# Patient Record
Sex: MALE | Race: WHITE | Employment: FULL TIME | URBAN - METROPOLITAN AREA
[De-identification: names, ages, dates, MRNs, and addresses within clinical notes are randomized per-mention and may not be internally consistent; named-entity substitution may affect disease eponyms.]

---

## 2018-10-31 ENCOUNTER — TRANSFERRED RECORDS (OUTPATIENT)
Dept: HEALTH INFORMATION MANAGEMENT | Facility: CLINIC | Age: 28
End: 2018-10-31

## 2018-11-13 ENCOUNTER — TRANSFERRED RECORDS (OUTPATIENT)
Dept: HEALTH INFORMATION MANAGEMENT | Facility: CLINIC | Age: 28
End: 2018-11-13

## 2018-11-15 ENCOUNTER — TRANSFERRED RECORDS (OUTPATIENT)
Dept: HEALTH INFORMATION MANAGEMENT | Facility: CLINIC | Age: 28
End: 2018-11-15

## 2018-11-16 ENCOUNTER — HOSPITAL ENCOUNTER (INPATIENT)
Facility: HOSPITAL | Age: 28
LOS: 19 days | Discharge: PSYCHIATRIC OR SUBSTANCE USE FACILITY | End: 2018-12-05
Attending: PSYCHIATRY & NEUROLOGY | Admitting: PSYCHIATRY & NEUROLOGY
Payer: COMMERCIAL

## 2018-11-16 DIAGNOSIS — F33.1 MAJOR DEPRESSIVE DISORDER, RECURRENT EPISODE, MODERATE (H): ICD-10-CM

## 2018-11-16 DIAGNOSIS — F17.213 CIGARETTE NICOTINE DEPENDENCE WITH WITHDRAWAL: ICD-10-CM

## 2018-11-16 DIAGNOSIS — F41.9 ANXIETY: Primary | ICD-10-CM

## 2018-11-16 PROBLEM — F29 PSYCHOSIS (H): Status: ACTIVE | Noted: 2018-11-16

## 2018-11-16 PROCEDURE — 12400011

## 2018-11-16 PROCEDURE — 12400000 ZZH R&B MH

## 2018-11-16 RX ORDER — HYDROXYZINE HYDROCHLORIDE 25 MG/1
25-50 TABLET, FILM COATED ORAL EVERY 4 HOURS PRN
Status: DISCONTINUED | OUTPATIENT
Start: 2018-11-16 | End: 2018-11-17

## 2018-11-16 RX ORDER — OLANZAPINE 10 MG/2ML
10 INJECTION, POWDER, FOR SOLUTION INTRAMUSCULAR
Status: DISCONTINUED | OUTPATIENT
Start: 2018-11-16 | End: 2018-12-05 | Stop reason: HOSPADM

## 2018-11-16 RX ORDER — ACETAMINOPHEN 325 MG/1
650 TABLET ORAL EVERY 4 HOURS PRN
Status: DISCONTINUED | OUTPATIENT
Start: 2018-11-16 | End: 2018-12-05 | Stop reason: HOSPADM

## 2018-11-16 RX ORDER — BISACODYL 10 MG
10 SUPPOSITORY, RECTAL RECTAL DAILY PRN
Status: DISCONTINUED | OUTPATIENT
Start: 2018-11-16 | End: 2018-12-05 | Stop reason: HOSPADM

## 2018-11-16 RX ORDER — TRAZODONE HYDROCHLORIDE 50 MG/1
50 TABLET, FILM COATED ORAL
Status: DISCONTINUED | OUTPATIENT
Start: 2018-11-16 | End: 2018-11-27

## 2018-11-16 RX ORDER — OLANZAPINE 10 MG/1
10 TABLET ORAL
Status: DISCONTINUED | OUTPATIENT
Start: 2018-11-16 | End: 2018-12-05 | Stop reason: HOSPADM

## 2018-11-16 RX ORDER — ALUMINA, MAGNESIA, AND SIMETHICONE 2400; 2400; 240 MG/30ML; MG/30ML; MG/30ML
30 SUSPENSION ORAL EVERY 4 HOURS PRN
Status: DISCONTINUED | OUTPATIENT
Start: 2018-11-16 | End: 2018-12-05 | Stop reason: HOSPADM

## 2018-11-16 ASSESSMENT — ACTIVITIES OF DAILY LIVING (ADL)
BATHING: 0-->INDEPENDENT
RETIRED_EATING: 0-->INDEPENDENT
RETIRED_COMMUNICATION: 0-->UNDERSTANDS/COMMUNICATES WITHOUT DIFFICULTY
AMBULATION: 0-->INDEPENDENT
DRESS: 0-->INDEPENDENT
TRANSFERRING: 0-->INDEPENDENT
COGNITION: 0 - NO COGNITION ISSUES REPORTED
SWALLOWING: 0-->SWALLOWS FOODS/LIQUIDS WITHOUT DIFFICULTY
FALL_HISTORY_WITHIN_LAST_SIX_MONTHS: NO
TOILETING: 0-->INDEPENDENT

## 2018-11-16 NOTE — IP AVS SNAPSHOT
STOP!!! DO NOT PRINT OR REFERENCE THIS AVS!!!  AVS displayed here is NOT the version that was given to the patient at discharge.  GO TO CHART REVIEW to print or review a copy of the AVS that was frozen/printed at time of discharge.                           MRN:[id number]                      After Visit Summary   11/16/2018    Carlo Allen    MRN: [id number]           Thank you!     Thank you for choosing [facility] for your care. Our goal is always to provide you with excellent care. Hearing back from our patients is one way we can continue to improve our services. Please take a few minutes to complete the written survey that you may receive in the mail after you visit with us. Thank you!        Patient Information     Date Of Birth          1990        Designated Caregiver       Most Recent Value    Caregiver    Will someone help with your care after discharge? no      About your hospital stay     You were admitted on:  November 16, 2018 You last received care in the: [facility]    You were discharged on:  December 5, 2018       Who to Call     For medical emergencies, please call 911.  For non-urgent questions about your medical care, please call your primary care provider or clinic, [phone]          Attending Provider     Provider Specialty    Segundo Michelle MD Psychiatry    Mahsa Iraheta NP Licensed Mental Health       Primary Care Provider Office Phone # Fax #    Maria K [place], [phone]      Further instructions from your care team       Behavioral Discharge Planning and Instructions    Summary: Carlo was admitted to  due to a revocation of his stay of commitment     Main Diagnosis: Major Depressive Disorder, Recurrent, Severe without psychosis, PTSD, Alcohol use disorder, moderate, dependence w/o withdrawal, Traumatic Brain Injury, unspecified    Major Treatments, Procedures and Findings: Stabilize with medications, connect with community  programs.    Symptoms to Report: feeling more aggressive, increased confusion, losing more sleep, mood getting worse or thoughts of suicide    Lifestyle Adjustment: Take all medications as prescribed, meet with doctor/ medication provider, out patient therapist, , and [facility] worker as scheduled. Abstain from alcohol or any unprescribed drugs.    Psychiatry Follow-up:     [facility]   - Pamela Rae - [phone]  CD  - Maggie Aldana - [phone]   Pool BROWNING (supervisor) - [phone]   Fax: [phone]    [facility] treatment  Contact - Bridget   [address] - [address]  [place]  [address]  Phone - [phone]  Fax - [phone]     Resources:   Crisis Intervention: 539.135.1728 or 116-600-9247 (TTY: 530.665.6029).  Call anytime for help.  National [place] on Mental Illness (www.mn.sayda.org): 975.935.4100 or 104-438-2024.  Alcoholics Anonymous (www.alcoholics-anonymous.org): Check your phone book for your local chapter.  Suicide Awareness Voices of Education (SAVE) (www.save.org): 516-575-CPTN (9347)  National Suicide Prevention Line (www.mentalhealthmn.org): 769-277-LZXY (6974)  Mental Health Consumer/Survivor Network of [place] (www.mhcsn.net): 806.573.3598 or 524-636-3150  Mental Health Association of [place] (www.mentalhealth.org): 195.608.7512 or 184-817-4468    General Medication Instructions:   See your medication sheet(s) for instructions.   Take all medicines as directed.  Make no changes unless your doctor suggests them.   Go to all your doctor visits.  Be sure to have all your required lab tests. This way, your medicines can be refilled on time.  Do not use any drugs not prescribed by your doctor.  Avoid alcohol.    [place] Area:  [facility], [facility] stabilization [facility]- [phone]  [facility] Crisis Line: 1-480.137.4326  [facility]ates For Family Peace: 811-2320  Sexual Assault Program [facility]: 689-541-2321 or 1-256.680.4028  [facility]  "[place] Battered Women's Program: 0-087-531-3254 Ext: 279       Calls answered Mon-Fri-8:00 am--4:30 pm    [place]:  [facility] Family Peace: 1-704.301.2864  [facility] first call for help: 0-574-757-2114  [facility] Crisis [facility]:  (674) [phone]      [place] Area:  Warm Line: 1-596.659.7676       Calls answered Tuesday--Saturday 4:00 pm--10:00 pm  [facility] Crisis Line - [phone]  [facility] 510-541-4062    [place] Statewide:  [place] Crisis and Referral Services: 1-124.919.5232  National Suicide Prevention Lifeline: 9-836-282-TALK (2828)   - dkv8hamn- Text \"Life\" to 25983  First Call for Help: 2-1-1  [facility] Helpline- 8-143-HPOV-HELP        Pending Results     No orders found from 11/14/2018 to 11/17/2018.            Statement of Approval     Ordered          12/04/18 1245  I have reviewed and agree with all the recommendations and orders detailed in this document.  EFFECTIVE NOW     Approved and electronically signed by:  Carlos Page NP             Admission Information     Date & Time Department Dept. Phone    11/16/2018 [facility] [phone]      Your Vitals Were     Blood Pressure Pulse Temperature Respirations Height Weight    106/63 85 97.2  F (36.2  C) (Tympanic) 14 1.727 m (5' 8\") 72.7 kg (160 lb 3.2 oz)    Pulse Oximetry BMI (Body Mass Index)                96% 24.36 kg/m2          Affectv Information     Affectv lets you send messages to your doctor, view your test results, renew your prescriptions, schedule appointments and more. To sign up, go to [email or web address] . Click on \"Log in\" on the left side of the screen, which will take you to the Welcome page. Then click on \"Sign up Now\" on the right side of the page.     You will be asked to enter the access code listed below, as well as some personal information. Please follow the directions to create your username and password.     Your access code is: [email or web address]  Expires: 3/4/2019 11:42 PM   "   Your access code will  in 90 days. If you need help or a new code, please call your [facility] clinic or [phone].        Care EveryWhere ID     This is your Care EveryWhere ID. This could be used by other organizations to access your [facility] medical records  [id number]        Equal Access to Services     JOSE RAFAEL VALDEZHARRY : Hadii cheo ku hadasho Soomaali, waaxda luqadaha, qaybta kaalmada adesatishyada, joanne bowdendeiondenver perdomo. So [facility] [phone].    ATENCIÓN: Si habla español, tiene a ghosh disposición servicios gratuitos de asistencia lingüística. Llame al [phone].    We comply with applicable federal civil rights laws and [place] laws. We do not discriminate on the basis of race, color, national origin, age, disability, sex, sexual orientation, or gender identity.               Review of your medicines      START taking        Dose / Directions    busPIRone 15 MG tablet   Commonly known as:  BUSPAR   Used for:  Anxiety        Dose:  15 mg   Take 1 tablet (15 mg) by mouth 2 times daily   Quantity:  60 tablet   Refills:  1       hydrOXYzine 50 MG tablet   Commonly known as:  ATARAX   Used for:  Anxiety        Dose:   mg   Take 1-2 tablets ( mg) by mouth every 4 hours as needed for anxiety   Quantity:  60 tablet   Refills:  1       nicotine 21 MG/24HR 24 hr patch   Commonly known as:  NICODERM CQ   Used for:  Cigarette nicotine dependence with withdrawal        Dose:  1 patch   Place 1 patch onto the skin daily   Quantity:  30 patch   Refills:  1       prazosin 1 MG capsule   Commonly known as:  MINIPRESS   Used for:  Anxiety        Dose:  1 mg   Take 1 capsule (1 mg) by mouth At Bedtime   Quantity:  30 capsule   Refills:  1       QUEtiapine 25 MG tablet   Commonly known as:  SEROquel   Used for:  Anxiety        Dose:  25-50 mg   Take 1-2 tablets (25-50 mg) by mouth 3 times daily as needed (anxiety)   Quantity:  60 tablet   Refills:  1       traZODone 100 MG tablet   Commonly  known as:  DESYREL   Used for:  Major depressive disorder, recurrent episode, moderate (H)        Dose:  200 mg   Take 2 tablets (200 mg) by mouth nightly as needed for sleep   Quantity:  60 tablet   Refills:  1         STOP taking     divalproex sodium delayed-release 500 MG DR tablet   Commonly known as:  DEPAKOTE           OLANZAPINE PO                Where to get your medicines      These medications were sent to [facility] - [address]  [address] [place] [address]     Phone:  [phone]     busPIRone 15 MG tablet    hydrOXYzine 50 MG tablet    nicotine 21 MG/24HR 24 hr patch    prazosin 1 MG capsule    QUEtiapine 25 MG tablet    traZODone 100 MG tablet                Protect others around you: Learn how to safely use, store and throw away your medicines at www.disposemymeds.org.             Medication List: This is a list of all your medications and when to take them. Check marks below indicate your daily home schedule. Keep this list as a reference.      Medications           Morning Afternoon Evening Bedtime As Needed    busPIRone 15 MG tablet   Commonly known as:  BUSPAR   Take 1 tablet (15 mg) by mouth 2 times daily   Last time this was given:  15 mg on 12/5/2018  6:47 AM                                hydrOXYzine 50 MG tablet   Commonly known as:  ATARAX   Take 1-2 tablets ( mg) by mouth every 4 hours as needed for anxiety   Last time this was given:  100 mg on 11/26/2018 12:58 PM                                nicotine 21 MG/24HR 24 hr patch   Commonly known as:  NICODERM CQ   Place 1 patch onto the skin daily   Last time this was given:  1 patch on 12/4/2018  8:43 AM                                prazosin 1 MG capsule   Commonly known as:  MINIPRESS   Take 1 capsule (1 mg) by mouth At Bedtime   Last time this was given:  1 mg on 12/4/2018  8:03 PM                                QUEtiapine 25 MG tablet   Commonly known as:  SEROquel   Take 1-2 tablets  (25-50 mg) by mouth 3 times daily as needed (anxiety)   Last time this was given:  50 mg on 12/4/2018  8:05 PM                                traZODone 100 MG tablet   Commonly known as:  DESYREL   Take 2 tablets (200 mg) by mouth nightly as needed for sleep   Last time this was given:  200 mg on 12/4/2018  8:05 PM                                          More Information        Naltrexone Hydrochloride, Bupropion Hydrochloride Oral tablet, extended-release  What is this medicine?  BUPROPION; NALTREXONE (byoo PROE pee on; nal TREX one) is a combination product used to promote and maintain weight loss in obese adults or overweight adults who also have weight related medical problems. This medicine should be used with a reduced calorie diet and increased physical activity.  This medicine may be used for other purposes; ask your health care provider or pharmacist if you have questions.  What should I tell my health care provider before I take this medicine?  They need to know if you have any of these conditions:    an eating disorder, such as anorexia or bulimia    diabetes    glaucoma    head injury    heart disease    high blood pressure    history of a drug or alcohol abuse problem    history of a tumor or infection of your brain or spine    history of stroke    history of irregular heartbeat    kidney disease    liver disease    mental illness such asbipolar disorder or psychosis    seizures    suicidal thoughts, plans, or attempt; a previous suicide attempt by you or a family member    an unusual or allergic reaction to bupropion, naltrexone, other medicines, foods, dyes, or preservativesbreast-feeding    pregnant or trying to become pregnant  How should I use this medicine?  Take this medicine by mouth with a glass of water. Follow the directions on the prescription label. Take this medicine in the morning and in the evenings as directed by your healthcare professional. You can take it with or without food. Do  not take with high-fat meals as this may increase your risk of seizures. Do not crush, chew, or cut these tablets. Do not take your medicine more often than directed. Do not stop taking this medicine suddenly except upon the advice of your doctor.  A special MedGuide will be given to you by the pharmacist with each prescription and refill. Be sure to read this information carefully each time.  Talk to your pediatrician regarding the use of this medicine in children. Special care may be needed.  Overdosage: If you think you've taken too much of this medicine contact a poison control center or emergency room at once.  NOTE: This medicine is only for you. Do not share this medicine with others.  What if I miss a dose?  If you miss a dose, skip the missed dose and take your next tablet at the regular time. Do not take double or extra doses.  What may interact with this medicine?  Do not take this medicine with any of the following medications:    any prescription or street opioid drug like codiene, heroin, methadone    linezolid    MAOIs like Carbex, Eldepryl, Marplan, Nardil, and Parnate    methylene blue (injected into a vein)    other medicines that contain bupropion like Zyban or Wellbutrin  This medicine may also interact with the following medications:    alcohol    certain medicines for anxiety or sleep    certain medicines for blood pressure like metoprolol, propranolol    certain medicines for depression or psychotic disturbances    certain medicines for HIV or AIDS like efavirenz, lopinavir, nelfinavir, ritonavir    certain medicines for irregular heart beat like propafenone, flecainide    certain medicines for Parkinson's disease like amantadine, levodopa    certain medicines for seizures like carbamazepine, phenytoin, phenobarbital    cimetidine    clopidogrel    cyclophosphamide    disulfiram    furazolidone    isoniazid    nicotine    orphenadrine    procarbazine    steroid medicines like prednisone or  cortisone    stimulant medicines for attention disorders, weight loss, or to stay awake    tamoxifen    theophylline    thioridazine    thiotepa    ticlopidine    tramadol    warfarin  This list may not describe all possible interactions. Give your health care provider a list of all the medicines, herbs, non-prescription drugs, or dietary supplements you use. Also tell them if you smoke, drink alcohol, or use illegal drugs. Some items may interact with your medicine.  What should I watch for while using this medicine?  This medicine is intended to be used in addition to a healthy diet and appropriate exercise. The best results are achieved this way. Do not increase or in any way change your dose without consulting your doctor or health care professional. Do not take this medicine with other prescription or over-the-counter weight loss products without consulting your doctor or health care professional. Your doctor should tell you to stop taking this medicine if you do not lose a certain amount of weight within the first 12 weeks of treatment.  Visit your doctor or health care professional for regular checkups. Your doctor may order blood tests or other tests to see how you are doing.  This medicine may affect blood sugar levels. If you have diabetes, check with your doctor or health care professional before you change your diet or the dose of your diabetic medicine.  Patients and their families should watch out for new or worsening depression or thoughts of suicide. Also watch out for sudden changes in feelings such as feeling anxious, agitated, panicky, irritable, hostile, aggressive, impulsive, severely restless, overly excited and hyperactive, or not being able to sleep. If this happens, especially at the beginning of treatment or after a change in dose, call your health care professional.  Avoid alcoholic drinks while taking this medicine. Drinking large amounts of alcoholic beverages, using sleeping or anxiety  medicines, or quickly stopping the use of these agents while taking this medicine may increase your risk for a seizure.  What side effects may I notice from receiving this medicine?  Side effects that you should report to your doctor or health care professional as soon as possible:    allergic reactions like skin rash, itching or hives, swelling of the face, lips, or tongue    breathing problems    changes in vision, hearing    chest pain    confusion    dark urine    depressed mood    fast or irregular heart beat    fever    hallucination, loss of contact with reality    increased blood pressure    light-colored stools    redness, blistering, peeling or loosening of the skin, including inside the mouth    right upper belly pain    seizures    suicidal thoughts or other mood changes    unusually weak or tired    vomiting    yellowing of the eyes or skin  Side effects that usually do not require medical attention (Report these to your doctor or health care professional if they continue or are bothersome.):    constipation    diarrhea    dizziness    dry mouth    headache    nausea    trouble sleeping  This list may not describe all possible side effects. Call your doctor for medical advice about side effects. You may report side effects to FDA at 7-010-FDA-1415.  Where should I keep my medicine?  Keep out of the reach of children.  Store at room temperature between 15 and 30 degrees C (59 and 86 degrees F). Throw away any unused medicine after the expiration date.  NOTE: This sheet is a summary. It may not cover all possible information. If you have questions about this medicine, talk to your doctor, pharmacist, or health care provider.  NOTE:This sheet is a summary. It may not cover all possible information. If you have questions about this medicine, talk to your doctor, pharmacist, or health care provider. Copyright  2016 Gold Standard

## 2018-11-16 NOTE — IP AVS SNAPSHOT
[facility]    [address] [place] [address]    Phone:  [phone]    Fax:  [phone]                                       After Visit Summary   11/16/2018    Carlo Allen    MRN: [id number]           After Visit Summary Signature Page     I have received my discharge instructions, and my questions have been answered. I have discussed any challenges I see with this plan with the nurse or doctor.    ..........................................................................................................................................  Patient/Patient Representative Signature      ..........................................................................................................................................  Patient Representative Print Name and Relationship to Patient    ..................................................               ................................................  Date                                   Time    ..........................................................................................................................................  Reviewed by Signature/Title    ...................................................              ..............................................  Date                                               Time          22EPIC Rev 08/18

## 2018-11-17 PROCEDURE — 25000132 ZZH RX MED GY IP 250 OP 250 PS 637: Performed by: NURSE PRACTITIONER

## 2018-11-17 PROCEDURE — 12400011

## 2018-11-17 PROCEDURE — 99223 1ST HOSP IP/OBS HIGH 75: CPT | Performed by: NURSE PRACTITIONER

## 2018-11-17 PROCEDURE — 12400000 ZZH R&B MH

## 2018-11-17 RX ORDER — DIVALPROEX SODIUM 500 MG/1
500 TABLET, DELAYED RELEASE ORAL DAILY
Status: ON HOLD | COMMUNITY
End: 2018-12-04

## 2018-11-17 RX ORDER — HYDROXYZINE HYDROCHLORIDE 25 MG/1
50-100 TABLET, FILM COATED ORAL EVERY 4 HOURS PRN
Status: DISCONTINUED | OUTPATIENT
Start: 2018-11-17 | End: 2018-12-05 | Stop reason: HOSPADM

## 2018-11-17 RX ORDER — DIVALPROEX SODIUM 500 MG/1
1000 TABLET, DELAYED RELEASE ORAL AT BEDTIME
Status: ON HOLD | COMMUNITY
End: 2018-12-04

## 2018-11-17 RX ORDER — NICOTINE 21 MG/24HR
1 PATCH, TRANSDERMAL 24 HOURS TRANSDERMAL DAILY
Status: DISCONTINUED | OUTPATIENT
Start: 2018-11-17 | End: 2018-12-05 | Stop reason: HOSPADM

## 2018-11-17 RX ORDER — LAMOTRIGINE 25 MG/1
25 TABLET ORAL DAILY
Status: DISCONTINUED | OUTPATIENT
Start: 2018-11-17 | End: 2018-11-24

## 2018-11-17 RX ORDER — PRAZOSIN HYDROCHLORIDE 1 MG/1
1 CAPSULE ORAL AT BEDTIME
Status: DISCONTINUED | OUTPATIENT
Start: 2018-11-17 | End: 2018-12-05 | Stop reason: HOSPADM

## 2018-11-17 RX ADMIN — NICOTINE 1 PATCH: 21 PATCH, EXTENDED RELEASE TRANSDERMAL at 09:20

## 2018-11-17 RX ADMIN — PHENYTOIN 1 MG: 125 SUSPENSION ORAL at 20:05

## 2018-11-17 RX ADMIN — LAMOTRIGINE 25 MG: 25 TABLET ORAL at 09:20

## 2018-11-17 RX ADMIN — HYDROXYZINE HYDROCHLORIDE 50 MG: 25 TABLET, FILM COATED ORAL at 09:19

## 2018-11-17 RX ADMIN — HYDROXYZINE HYDROCHLORIDE 100 MG: 25 TABLET, FILM COATED ORAL at 20:36

## 2018-11-17 RX ADMIN — TRAZODONE HYDROCHLORIDE 50 MG: 50 TABLET ORAL at 00:20

## 2018-11-17 ASSESSMENT — ACTIVITIES OF DAILY LIVING (ADL)
GROOMING: INDEPENDENT
LAUNDRY: UNABLE TO COMPLETE
DRESS: SCRUBS (BEHAVIORAL HEALTH);INDEPENDENT
ORAL_HYGIENE: INDEPENDENT

## 2018-11-17 NOTE — PLAN OF CARE
Problem: Patient Care Overview  Goal: Individualization & Mutuality  Patient will be cooperative with assessments  Patient will eat at least 75% meals  Patient will sleep 6-8 hours     0020 patient had a snack after having a short call to [facility] , then showered  And requested and given a prn trazodone 50 mg by nurse and then went to bed. 0532 patient continues to sleep at this time.
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
## 2018-11-17 NOTE — PROGRESS NOTES
11/17/18 0022   Patient Belongings   Did you bring any home meds/supplements to the hospital?  No   Patient Belongings cell phone/electronics;clothing;money (see comment);wallet  ($68.00)   Disposition of Belongings Sent to security per site process;Kept with patient   Belongings Search Yes   Clothing Search Yes   Second Staff Ashleigh   General Info Comment Pillow with blue case Brown boots Black sweats [place] pack 2 cigs phone  6 pairs black socks grey under wear blue underwear 2 gray long sleeve 2 black dick maroon dick blue dick 2 jeans folder misc papers white slipon shoes belt grey hoodie camo hat skoel tin .21 cents tooth brush and paste lotion hair brush body wash deoderant blistex    List items sent to safe: Samsung phone no cracks black case brown wallet express card visa card D.L. $68.00 misc cards  All other belongings put in assigned cubby in belongings room.       I have reviewed my belongings list on admission and verify that it is correct.     Patient signature_______________________________    Second staff witness (if patient unable to sign) ______________________________       I have received all my belongings at discharge.    Patient signature________________________________    Terry   11/17/2018  12:30 AM    11/25/18 Items added to belongings: Black jacket

## 2018-11-17 NOTE — PLAN OF CARE
Face to face end of shift report received from Amanda CASTRO RN. Rounding completed. Patient observed in [facility].     Mellissa Mills  11/17/2018  3:33 PM

## 2018-11-17 NOTE — PROGRESS NOTES
Face to face end of shift report received from norma hoyt rn at 2300 report. . Rounding completed. Patient observed.     Alida Caceres  11/17/2018  12:42 AM
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
## 2018-11-17 NOTE — PLAN OF CARE
"Problem: Patient Care Overview  Goal: Individualization & Mutuality  Patient will be cooperative with assessments  Patient will eat at least 75% meals  Patient will sleep 6-8 hours     Outcome: No Change  Patient endorsed anxiety rated 8 of 10 and depression at 4 of 10. He denied HI/SI and hallucinations. Patient denied pain. He talked about feeling like he is here against his will and he doesn't feel it is fair that he asked for help and then was forced to go to the hospital. Patient stated he doesn't usually have trouble with sleep when he's at home. He declined a flu shot and declined to see a  while inpatient. Patient reported his only medical concerns are his chron's but he controls it with diet. Patient requested 50mg Atarax at after finding out he is not allowed to leave. He was very concerned about his fiance and reported she was in the ER in [place] \"having a miscarriage.\" Patient received education on his two new meds which are Lamictal and Minipress. He denied having any more questions about these meds. Patient slept soundly after taking the Atarax and was unable to finish all the admission questions. He showered.    Problem: Depressive Symptoms  Goal: Depressive Symptoms  Signs and symptoms of listed problems will be absent or manageable.  Patient's depression will be improved by discharge  Patient will remain calm and in control    Patient will verbalize if anxiety and or anger is presenting a problem.   Outcome: No Change  No progress made towards goals.       "

## 2018-11-17 NOTE — PLAN OF CARE
Face to face end of shift report received from Alida AGUILAR RN. Rounding completed. Patient observed in the lounge..     Amanda Britt  11/17/2018  12:58 PM
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
## 2018-11-18 PROCEDURE — 12400000 ZZH R&B MH

## 2018-11-18 PROCEDURE — 12400011

## 2018-11-18 PROCEDURE — 25000132 ZZH RX MED GY IP 250 OP 250 PS 637: Performed by: NURSE PRACTITIONER

## 2018-11-18 RX ADMIN — LAMOTRIGINE 25 MG: 25 TABLET ORAL at 08:37

## 2018-11-18 RX ADMIN — PHENYTOIN 1 MG: 125 SUSPENSION ORAL at 20:34

## 2018-11-18 RX ADMIN — NICOTINE 1 PATCH: 21 PATCH, EXTENDED RELEASE TRANSDERMAL at 08:37

## 2018-11-18 RX ADMIN — HYDROXYZINE HYDROCHLORIDE 100 MG: 25 TABLET, FILM COATED ORAL at 13:03

## 2018-11-18 ASSESSMENT — ACTIVITIES OF DAILY LIVING (ADL)
GROOMING: INDEPENDENT
GROOMING: INDEPENDENT
ORAL_HYGIENE: INDEPENDENT
LAUNDRY: UNABLE TO COMPLETE
DRESS: SCRUBS (BEHAVIORAL HEALTH);INDEPENDENT
ORAL_HYGIENE: INDEPENDENT
LAUNDRY: UNABLE TO COMPLETE
DRESS: INDEPENDENT;SCRUBS (BEHAVIORAL HEALTH)

## 2018-11-18 NOTE — PLAN OF CARE
"Problem: Patient Care Overview  Goal: Individualization & Mutuality  Patient will be cooperative with assessments  Patient will eat at least 75% meals  Patient will sleep 6-8 hours     Outcome: No Change  Patient is pleasant and cooperative with nursing assessment. Endorsing anxiety and depression due to hospitalization. States he is upset due to asking for help and being held in hospital under revocation of commitment. Tells this writer that his fiance is also going through a miscarriage at this time and says it is the second loss of child they have experienced. Becomes tearful during conversation. States he would like to get help due to his depression causing him to drink, then becoming aggressive, and then \"being full of regret after\". Denies SI stating he has never made a plan or attempt for suicide. States \"I only said that as a cry for help\". Denies HI, pain, and hallucinations. Agrees to contact staff if having thoughts of self harm. Patient social with peers and spends majority of shift in lounge. Agrees to make needs known.   Received PRN Atarax 100 mg at 2036 for anxiety.   Requesting nicotine lozenge instead of gum (sticky note done).     Problem: Depressive Symptoms  Goal: Depressive Symptoms  Signs and symptoms of listed problems will be absent or manageable.  Patient's depression will be improved by discharge  Patient will remain calm and in control    Patient will verbalize if anxiety and or anger is presenting a problem.   Outcome: No Change  Reports depression this shift due to commitment issues.   Remains in control of behaviors this shift.   Endorses anxiety but declines PRN. Does not exhibit any aggressive symptoms this shift.       "

## 2018-11-18 NOTE — PROGRESS NOTES
Face to face end of shift report received from dayne wheeler rn at 2300 report.. Rounding completed. Patient observed.     Alida Caceres  11/18/2018  12:30 AM

## 2018-11-18 NOTE — PLAN OF CARE
Face to face end of shift report received from STEPH Irwin. Rounding completed. Patient observed in day room watching tv.      Laura Richter  11/18/2018  7:52 AM

## 2018-11-18 NOTE — PLAN OF CARE
Face to face end of shift report received from STEPH Sanchez. Rounding completed. Patient observed laying in bed.      Laura Richter  11/18/2018  3:42 PM

## 2018-11-18 NOTE — PLAN OF CARE
Problem: Patient Care Overview  Goal: Individualization & Mutuality  Patient will be cooperative with assessments  Patient will eat at least 75% meals  Patient will sleep 6-8 hours     Pt was cooperative with nursing assessment and medications.  Pt reports 10/10 anxiety and said that he never had depression.  Pt ate breakfast and went back to bed.  Pt ate lunch and watched some tv.  Pt came up and asked for Atarax, which he received 100 mg at 1303.  He then went to lay down.  Pt reports that he still wishes he was in the ED.  That he is wants to go CD tx for his ETOH use and that he already had his Rule 25.      EVENING SHIFT:    Pt was in bed at start of shift.  He got up and watched tv.  He has a blunted/flat affect.  Pt said that he has been at a 10/10 anxiety all day and nothing has relieved it.      Goal: Team Discussion  Team Plan:   BEHAVIORAL TEAM DISCUSSION    Participants:   Progress:   Continued Stay Criteria/Rationale:   Medical/Physical:   Precautions:   Behavioral Orders   Procedures     Code 1 - Restrict to Unit     Routine Programming     As clinically indicated     Self Injury Precaution     Status 15     Every 15 minutes.     Plan:   Rationale for change in precautions or plan:       Problem: Depressive Symptoms  Goal: Depressive Symptoms  Signs and symptoms of listed problems will be absent or manageable.  Patient's depression will be improved by discharge  Patient will remain calm and in control    Patient will verbalize if anxiety and or anger is presenting a problem.   Pt reported high anxiety.  Will reevaluate his depression when pt wakes.

## 2018-11-19 PROCEDURE — 12400000 ZZH R&B MH

## 2018-11-19 PROCEDURE — 25000132 ZZH RX MED GY IP 250 OP 250 PS 637: Performed by: NURSE PRACTITIONER

## 2018-11-19 PROCEDURE — 97165 OT EVAL LOW COMPLEX 30 MIN: CPT | Mod: GO

## 2018-11-19 PROCEDURE — 99232 SBSQ HOSP IP/OBS MODERATE 35: CPT | Performed by: NURSE PRACTITIONER

## 2018-11-19 PROCEDURE — 12400011

## 2018-11-19 PROCEDURE — 40000133 ZZH STATISTIC OT WARD VISIT

## 2018-11-19 RX ADMIN — LAMOTRIGINE 25 MG: 25 TABLET ORAL at 08:35

## 2018-11-19 RX ADMIN — PHENYTOIN 1 MG: 125 SUSPENSION ORAL at 20:22

## 2018-11-19 RX ADMIN — HYDROXYZINE HYDROCHLORIDE 100 MG: 25 TABLET, FILM COATED ORAL at 20:22

## 2018-11-19 RX ADMIN — NICOTINE 1 PATCH: 21 PATCH, EXTENDED RELEASE TRANSDERMAL at 09:15

## 2018-11-19 RX ADMIN — TRAZODONE HYDROCHLORIDE 50 MG: 50 TABLET ORAL at 21:09

## 2018-11-19 ASSESSMENT — ACTIVITIES OF DAILY LIVING (ADL)
DRESS: INDEPENDENT;SCRUBS (BEHAVIORAL HEALTH)
LAUNDRY: UNABLE TO COMPLETE
GROOMING: INDEPENDENT
ORAL_HYGIENE: INDEPENDENT
ORAL_HYGIENE: INDEPENDENT
GROOMING: INDEPENDENT
LAUNDRY: UNABLE TO COMPLETE
DRESS: SCRUBS (BEHAVIORAL HEALTH);INDEPENDENT

## 2018-11-19 NOTE — PROGRESS NOTES
Face to face end of shift report received from frankie wheeler rn at 2300 report.. Rounding completed. Patient observed.     Alida Caceres  11/19/2018  12:52 AM

## 2018-11-19 NOTE — PLAN OF CARE
Face to face end of shift report received from Jose MOSQUEDA RN. Rounding completed. Patient observed in [facility].     Padmaja Cabello  11/19/2018  4:19 PM

## 2018-11-19 NOTE — PLAN OF CARE
Problem: Patient Care Overview  Goal: Individualization & Mutuality  Patient will be cooperative with assessments  Patient will eat at least 75% meals  Patient will sleep 6-8 hours     0015 in bed with easy respirations, presenting sleeping.0335 up for water and back to bed. No requests. 0540 earlier patient up for ear plugs and then back to bed .    Problem: Depressive Symptoms  Goal: Depressive Symptoms  Signs and symptoms of listed problems will be absent or manageable.  Patient's depression will be improved by discharge  Patient will remain calm and in control    Patient will verbalize if anxiety and or anger is presenting a problem.   0015 in bed with easy respirations, presenting sleeping.

## 2018-11-19 NOTE — PLAN OF CARE
Problem: Patient Care Overview  Goal: Individualization & Mutuality  Patient will be cooperative with assessments  Patient will eat at least 75% meals  Patient will sleep 6-8 hours     Outcome: Improving  Pt talked with me about his struggle with depression, alcoholism, and PTSD. He shared with me that since he has been on the Stay of Commitment, he has followed through on the recommendations from his . He told me that he went to the ER to help him since he felt that his drinking had become a problem. He is frustrated that his Stay was revoked.  He told me that he wants to go to a MI/CD program to make changes in his life. He denied having any suicidal thoughts currently but does feel depressed, powerless, and hopeless.  Pt did attend all of the available groups today though does not interact with peers very much.  Pt told me that he spoke with his  and she said that there may be an opening for Mi/CD treatment at [facility].    Problem: Depressive Symptoms  Goal: Depressive Symptoms  Signs and symptoms of listed problems will be absent or manageable.  Patient's depression will be improved by discharge  Patient will remain calm and in control    Patient will verbalize if anxiety and or anger is presenting a problem.   Outcome: Improving  Pt reports his anxiety is significant, his depression is about the same, and he has been in control on the unit.  Goal: Social and Therapeutic (Depression)  Signs and symptoms of listed problems will be absent or manageable.   Outcome: Improving  Pt reports that he is still depressed and feels powerless.

## 2018-11-19 NOTE — PROGRESS NOTES
"[facility]  Psychiatric Progress Note      Impression:     Carlo Allen is a 28 year old male who presented via [facility] after stay of commitment was revoked secondary to noncompliance with medications and ongoing alcohol use. Also failed to meet with . Last drink was reportedly on Monday. No withdrawal noted in ED. They are looking at dual diagnosis treatment but would like mental health stabilized first. History of PTSD. Is a vet but PTSD is not related to  activity. History of aggression toward property when intoxicated. No violence toward people.    Carlo has not noticed any benefits from medications at this point. Reports feeling depressed and anxious because of his situation, being \"stuck\" here, and his fiance losing the baby. He does not want any medication changes at this point. We did discuss Gabapentin for his poor frustration tolerance, but secondary to recent limitations on this medication, he does not want to risk denial for admission anywhere because of this. Eating and sleep well. Is just \"waiting\" for MICD programming to be arranged.            DIagnoses:     Major Depressive Disorder, Recurrent, Severe without psychosis  PTSD  Alcohol use disorder, moderate, dependence w/o withdrawal  Traumatic Brain Injury, unspecified    Attestation:  Patient has been seen and evaluated by me,  Carlos Page NP          Interim History:   The patient's care was discussed with the treatment team and chart notes were reviewed.          Medications:   I have reviewed this patient's current medications  Prescription Medications as of 11/19/2018             divalproex sodium delayed-release (DEPAKOTE) 500 MG DR tablet Take 500 mg by mouth daily Take one tab daily in the AM    divalproex sodium delayed-release (DEPAKOTE) 500 MG DR tablet Take 1,000 mg by mouth At Bedtime    OLANZAPINE PO Take 10 mg by mouth 2 times daily      Facility Administered Medications as of 11/19/2018       " "      acetaminophen (TYLENOL) tablet 650 mg Take 2 tablets (650 mg) by mouth every 4 hours as needed for mild pain    alum & mag hydroxide-simethicone (MYLANTA ES/MAALOX  ES) suspension 30 mL Take 30 mLs by mouth every 4 hours as needed for indigestion    bisacodyl (DULCOLAX) Suppository 10 mg Place 1 suppository (10 mg) rectally daily as needed for constipation    hydrOXYzine (ATARAX) tablet  mg Take 2-4 tablets ( mg) by mouth every 4 hours as needed for anxiety    lamoTRIgine (LaMICtal) tablet 25 mg Take 1 tablet (25 mg) by mouth daily    magnesium hydroxide (MILK OF MAGNESIA) suspension 30 mL Take 30 mLs by mouth nightly as needed for constipation    nicotine (NICODERM CQ) 21 MG/24HR 24 hr patch 1 patch Place 1 patch onto the skin daily    nicotine Patch in Place Place onto the skin every 8 hours    nicotine patch REMOVAL Place onto the skin daily    nicotine polacrilex lozenge 4 mg Place 1 lozenge (4 mg) inside cheek every 2 hours as needed for smoking cessation    OLANZapine (zyPREXA) injection 10 mg Inject 10 mg into the muscle every 2 hours as needed for agitation (associated with psychosis or cassi)    Linked Group 1:  \"Or\" Linked Group Details     OLANZapine (zyPREXA) tablet 10 mg Take 1 tablet (10 mg) by mouth every 2 hours as needed for agitation (associated with psychosis or cassi)    Linked Group 1:  \"Or\" Linked Group Details     prazosin (MINIPRESS) capsule 1 mg Take 1 capsule (1 mg) by mouth At Bedtime    traZODone (DESYREL) tablet 50 mg Take 1 tablet (50 mg) by mouth nightly as needed for sleep        10 point ROS negative        Allergies:   No Known Allergies         Psychiatric Examination:   /56  Pulse 76  Temp 97.1  F (36.2  C) (Tympanic)  Resp 14  Ht 1.727 m (5' 8\")  Wt 72.1 kg (158 lb 14.4 oz)  SpO2 98%  BMI 24.16 kg/m2  Weight is 158 lbs 14.4 oz  Body mass index is 24.16 kg/(m^2).    Appearance:  awake, alert and adequately groomed  Attitude:  cooperative  Eye " Contact:  good  Mood:  depressed  Affect:  mood congruent  Speech:  clear, coherent  Psychomotor Behavior:  no evidence of tardive dyskinesia, dystonia, or tics  Thought Process:  logical, linear and goal oriented  Associations:  no loose associations  Thought Content:  no evidence of suicidal ideation or homicidal ideation and no evidence of psychotic thought  Insight:  good  Judgment:  intact  Oriented to:  time, person, and place  Attention Span and Concentration:  intact  Recent and Remote Memory:  intact  Fund of Knowledge: appropriate  Muscle Strength and Tone: normal  Gait and Station: Normal           Labs:   No labs today           Plan:   Continue Lamictal 25mg daily.   Continue Prazosin 1mg at bed.  Continue working with OT as indicated  ELOS dependent on placement. Discharge to lower level of care has proven high risk secondary to alcohol relapse, mood lability, and suicidal threats.

## 2018-11-19 NOTE — PLAN OF CARE
Problem: Patient Care Overview  Goal: Plan of Care/Patient Progress Review  Outcome: Improving  Discharge Planner OT   Patient plan for discharge: Pt would like to go to MICD treatment, staff are working with  to determine best option  Current status: Pt completed OT eval, open to services and learning new healthy coping skills.  Completed the MOCA and scored 27/30 indicating normal cognition.  Pt was issued lavender massage oil and was encouraged to use the alpha-stim  Barriers to return to prior living situation: ETOH abuse, anger, anxiety  Recommendations for discharge: MICD treatment to address ETOH abuse and learn healthy ways to manage anxiety  Rationale for recommendations: poor healthy coping skills leading to use of alcohol and further anxiety, shame and guilt.         Entered by: Elizabeth Tucker 11/19/2018 2:54 PM

## 2018-11-19 NOTE — PLAN OF CARE
Face to face end of shift report received from Coral CHOI. Rounding completed. Patient observed in dayroom and introduced to nursing for the shift    Jose Feng  11/19/2018  7:57 AM

## 2018-11-19 NOTE — PLAN OF CARE
Problem: Patient Care Overview  Goal: Discharge Needs Assessment  Outcome: No Change  Left a message with pt Elias

## 2018-11-19 NOTE — PLAN OF CARE
Problem: Patient Care Overview  Goal: Team Discussion  Team Plan:   BEHAVIORAL TEAM DISCUSSION    Participants:  Mahsa Iraheta NP, Carlos Page NP, Lisa Daniels NP, Veronica Oconnor LSW, Zenaida Willams LSW, Smitha Pisano RN, Laura Portillo RN, Belinda Cuello RN, Reyna Hankins OT, Elizabeth Tucker OT  Progress: fair, social and appropriate  Continued Stay Criteria/Rationale: continue to monitor for medication side effects and effectiveness  Medical/Physical: Crohns Disease, PTSD, ETOH abuse  Precautions:   Behavioral Orders   Procedures     Code 1 - Restrict to Unit     Routine Programming     As clinically indicated     Self Injury Precaution     Status 15     Every 15 minutes.     Plan: revoked, fully committed, looking into appropriate discharge plan  Rationale for change in precautions or plan: none    Patient Active Problem List   Diagnosis     Psychosis (H)       Current Facility-Administered Medications:      acetaminophen (TYLENOL) tablet 650 mg, 650 mg, Oral, Q4H PRN, Mahsa Iraheta NP     alum & mag hydroxide-simethicone (MYLANTA ES/MAALOX  ES) suspension 30 mL, 30 mL, Oral, Q4H PRN, Mahsa Iraheta NP     bisacodyl (DULCOLAX) Suppository 10 mg, 10 mg, Rectal, Daily PRN, Mahsa Iraheta NP     hydrOXYzine (ATARAX) tablet  mg,  mg, Oral, Q4H PRN, Carlos Page NP, 100 mg at 11/18/18 1303     lamoTRIgine (LaMICtal) tablet 25 mg, 25 mg, Oral, Daily, Carlos Page NP, 25 mg at 11/19/18 0835     magnesium hydroxide (MILK OF MAGNESIA) suspension 30 mL, 30 mL, Oral, At Bedtime PRN, Mahsa Iraheta NP     nicotine (NICODERM CQ) 21 MG/24HR 24 hr patch 1 patch, 1 patch, Transdermal, Daily, Carlos Page NP, 1 patch at 11/19/18 0915     nicotine Patch in Place, , Transdermal, Q8H, Carlos Page NP     nicotine patch REMOVAL, , Transdermal, Daily, Carlos Page NP     nicotine polacrilex lozenge 4 mg, 4 mg, Buccal, Q2H PRN, White, Carlos L, NP     OLANZapine (zyPREXA) tablet 10  mg, 10 mg, Oral, Q2H PRN **OR** OLANZapine (zyPREXA) injection 10 mg, 10 mg, Intramuscular, Q2H PRN, Mahsa Iraheta NP     prazosin (MINIPRESS) capsule 1 mg, 1 mg, Oral, At Bedtime, Carlos Page NP, 1 mg at 11/18/18 2034     traZODone (DESYREL) tablet 50 mg, 50 mg, Oral, At Bedtime PRN, Mahsa Iraheta NP, 50 mg at 11/17/18 0020

## 2018-11-19 NOTE — PROGRESS NOTES
11/19/18 0900   Quick Adds   Type of Visit Initial Occupational Therapy Evaluation   Living Environment   Lives With significant other   Living Arrangements [place]   Home Accessibility no concerns   Transportation Available car   Living [place]   Functional Level Prior   Ambulation 0-->independent   Transferring 0-->independent   Toileting 0-->independent   Bathing 0-->independent   Dressing 0-->independent   Eating 0-->independent   Communication 0-->understands/communicates without difficulty   Swallowing 0-->swallows foods/liquids without difficulty   Cognition 0 - no cognition issues reported   Which of the above functional risks had a recent onset or change? none   General Information   Onset of Illness/Injury or Date of Surgery - Date 11/16/18   Referring Physician Carlos Page NP   Patient/Family Goals Statement to get to MICD treatment   Additional Occupational Profile Info/Pertinent History of Current Problem Pt is a 28 year old male presenting to therapy for cogntiive testing and coping skill education.  Pt has a history of PTSD and TBI.  Reports no concerns with his memory or concentration but some anger impulsivity since his TBI.  Pt admits to ETOH abuse and guilt/shame related to his use. Also reports some paranoia and uncontrollable anxiety and states that alcohol helps to cope. Pt lives with kyaw michelle and takes care of the yardwork and pays bills.  [place]  Pt wants to go to MICD treatment as he feels his anxiety leads to his ETOH abuse.  States that current stressors for him include work, money and his relationship.  Has never been to treatment in the past.  Pt reports that he enjoys rock music, exercise such as snowboarding, biking, yoga and hiking, yardwork, woodworking, and being outdoors.  States that he uses lavander EO at night to help fall asleep.  Has also practiced meditation, tapping and mindfulness in the past, which  he states were not helpful.  Has alpha-stim ordered here.       General Observations Pt is polite and open to work with OT.  Really wanting to go to MICD treatment.    Cognitive Status Examination   Orientation orientation to person, place and time   Level of Consciousness alert   Able to Follow Commands WNL/WFL   Personal Safety (Cognitive) WNL/WFL   Memory intact   Attention No deficits were identified   Organization/Problem Solving No deficits were identified   Executive Function No deficits were identified   Cognitive Comment Completed the MOCA and scored 27/30 indicaitng normal cognition.    Instrumental Activities of Daily Living (IADL)   Previous Responsibilities work;driving;finances;medication management;yardwork   Activities of Daily Living Analysis   Impairments Contributing to Impaired Activities of Daily Living fear and anxiety   General Therapy Interventions   Planned Therapy Interventions other (see comments)  (coping skills)   Clinical Impression   Criteria for Skilled Therapeutic Interventions Met yes, treatment indicated   OT Diagnosis poor coping skills   Influenced by the following impairments anxiety, ETOH abuse, stress, anger impulsivity   Assessment of Occupational Performance 1-3 Performance Deficits   Identified Performance Deficits coping skills   Clinical Decision Making (Complexity) Low complexity   Therapy Frequency 3 times/wk   Predicted Duration of Therapy Intervention (days/wks) 1 week   Anticipated Discharge Disposition Other (see comments)  (MICD treatment)   Risks and Benefits of Treatment have been explained. Yes   Patient, Family & other staff in agreement with plan of care Yes   Clinical Impression Comments Pt is appropriate for OT services to provide further education on healthy coping skills to help manage anxiety.  Pt states that he feels his anxiety leads to his ETOH abuse.  States that he has tried many coping skills in the past and have found them to not be helpful.  Will  explore other coping skill options that he has not yet been educated in.       Total Evaluation Time   Total Evaluation Time (Minutes) 25

## 2018-11-20 PROCEDURE — 86480 TB TEST CELL IMMUN MEASURE: CPT | Performed by: NURSE PRACTITIONER

## 2018-11-20 PROCEDURE — 36415 COLL VENOUS BLD VENIPUNCTURE: CPT | Performed by: NURSE PRACTITIONER

## 2018-11-20 PROCEDURE — 12400011

## 2018-11-20 PROCEDURE — 25000132 ZZH RX MED GY IP 250 OP 250 PS 637: Performed by: NURSE PRACTITIONER

## 2018-11-20 RX ADMIN — HYDROXYZINE HYDROCHLORIDE 100 MG: 25 TABLET, FILM COATED ORAL at 20:20

## 2018-11-20 RX ADMIN — LAMOTRIGINE 25 MG: 25 TABLET ORAL at 08:33

## 2018-11-20 RX ADMIN — PHENYTOIN 1 MG: 125 SUSPENSION ORAL at 20:20

## 2018-11-20 RX ADMIN — NICOTINE 1 PATCH: 21 PATCH, EXTENDED RELEASE TRANSDERMAL at 08:33

## 2018-11-20 RX ADMIN — TRAZODONE HYDROCHLORIDE 50 MG: 50 TABLET ORAL at 20:20

## 2018-11-20 ASSESSMENT — ACTIVITIES OF DAILY LIVING (ADL)
DRESS: INDEPENDENT;SCRUBS (BEHAVIORAL HEALTH)
LAUNDRY: UNABLE TO COMPLETE
DRESS: SCRUBS (BEHAVIORAL HEALTH);INDEPENDENT
GROOMING: INDEPENDENT
GROOMING: INDEPENDENT
LAUNDRY: UNABLE TO COMPLETE
ORAL_HYGIENE: INDEPENDENT
ORAL_HYGIENE: INDEPENDENT

## 2018-11-20 NOTE — PLAN OF CARE
Face to face end of shift report received from Jose MOSQUEDA RN. Rounding completed. [place] [facility]     Padmaja Cabello  11/20/2018  4:06 PM

## 2018-11-20 NOTE — PLAN OF CARE
Problem: Patient Care Overview  Goal: Individualization & Mutuality  Patient will be cooperative with assessments  Patient will eat at least 75% meals  Patient will sleep 6-8 hours     0000 in bed with easy respirations, presenting sleeping.[id number] patient continues to sleep at this time.    Problem: Depressive Symptoms  Goal: Depressive Symptoms  Signs and symptoms of listed problems will be absent or manageable.  Patient's depression will be improved by discharge  Patient will remain calm and in control    Patient will verbalize if anxiety and or anger is presenting a problem.   0000 in bed with easy respirations, presenting sleeping.

## 2018-11-20 NOTE — PLAN OF CARE
Face to face end of shift report received from Coral CHOI. Rounding completed. Patient observed in dayroom and introduced to nursing for the shift    Jose Feng  11/20/2018  7:53 AM

## 2018-11-20 NOTE — PLAN OF CARE
Problem: Patient Care Overview  Goal: Individualization & Mutuality  Patient will be cooperative with assessments  Patient will eat at least 75% meals  Patient will sleep 6-8 hours     Outcome: No Change  Pt was up for breakfast this morning and made a call to his 's office. He told me that they had no news about placement for him.  He says that he he feels very frustrated, powerless, and depressed this morning. He denied having any suicidal thoughts. He did tell me that he feels like just sleeping today.  His affect is sad, flat and depressed. He is struggling with his feelings regarding trying to get help for his mental illness and alcohol addiction and ending up committed.  Pt says that he wants to go to a MI/CD treatment program as soon as possible.    I chatted with Pt about letting go and powerlessness in regards to change.  Pt said that he is trying to accept that he is going to get help.      Problem: Depressive Symptoms  Goal: Depressive Symptoms  Signs and symptoms of listed problems will be absent or manageable.  Patient's depression will be improved by discharge  Patient will remain calm and in control    Patient will verbalize if anxiety and or anger is presenting a problem.   Outcome: No Change  Pt reports that he feels very down this morning due to powerlessness. He reports that he feels   Goal: Social and Therapeutic (Depression)  Signs and symptoms of listed problems will be absent or manageable.   Outcome: No Change  Pt reports feeling depressed and powerless this morning

## 2018-11-20 NOTE — PLAN OF CARE
Problem: Patient Care Overview  Goal: Individualization & Mutuality  Patient will be cooperative with assessments  Patient will eat at least 75% meals  Patient will sleep 6-8 hours     Pt is cooperative with writer.  He is frustrated with not knowing what his plan is and being here.  Admits to some anxiety and depression r/t being here.  Denies SI, HI, pain, and hallucinations. Did attend groups this shift. 2022- Pt requested PRN atarax 100mg for anxiety.  Pt later reported effective.  2109- Pt requested PRN trazodone 50mg for sleep.    Problem: Depressive Symptoms  Goal: Depressive Symptoms  Signs and symptoms of listed problems will be absent or manageable.  Patient's depression will be improved by discharge  Patient will remain calm and in control    Patient will verbalize if anxiety and or anger is presenting a problem.   Pt continues to report anxiety and depression.  Pt has been in control of his behavior this shift.

## 2018-11-20 NOTE — PROGRESS NOTES
Face to face end of shift report received from david regalado rn at 2300 report.. Rounding completed. Patient observed.     Alida Caceres  11/20/2018  1:46 AM

## 2018-11-20 NOTE — PLAN OF CARE
"Problem: Patient Care Overview  Goal: Plan of Care/Patient Progress Review  Attempted to see pt this morning for OT.  Pt declined OT at this time and when asked if this write could back this afternoon and check on him he states \"No, im sorry I just want to think right now\".  Poor eye contact, irritable and tearful.  States that he is frustrated with his .  Declined all coping skills at this time to help him calm down.  Encouraged Carlo to talk to OT staff today if he decides he would like to try some coping techniques to help manage his anger and frustration.        "

## 2018-11-20 NOTE — PLAN OF CARE
Problem: Patient Care Overview  Goal: Team Discussion  Team Plan:   BEHAVIORAL TEAM DISCUSSION    Participants:  Mahsa Iraheta NP, Carlos Page NP, Lisa Daniels NP, Veronica Oconnor LSW, Zenaida Willams LSW, Smitha Pisano RN, Laura Portillo RN, Belinda Cuello RN, Elizabeth Tucker OT, Elizabeth Wilson OT  Progress: maintaining control, anxious, frustrated  Continued Stay Criteria/Rationale: unable to discharge to lesser level of care due to high risk of relapse and hospital readmission  Medical/Physical: Crohns Disease  Precautions:   Behavioral Orders   Procedures     Code 1 - Restrict to Unit     Routine Programming     As clinically indicated     Self Injury Precaution     Status 15     Every 15 minutes.     Plan: [facility] list, considering [facility]  Rationale for change in precautions or plan: none    Patient Active Problem List   Diagnosis     Psychosis (H)       Current Facility-Administered Medications:      acetaminophen (TYLENOL) tablet 650 mg, 650 mg, Oral, Q4H PRN, Mahsa Iraheta NP     alum & mag hydroxide-simethicone (MYLANTA ES/MAALOX  ES) suspension 30 mL, 30 mL, Oral, Q4H PRN, Mahsa Iraheta NP     bisacodyl (DULCOLAX) Suppository 10 mg, 10 mg, Rectal, Daily PRN, Mahsa Iraheta NP     hydrOXYzine (ATARAX) tablet  mg,  mg, Oral, Q4H PRN, Carlos Page NP, 100 mg at 11/19/18 2022     lamoTRIgine (LaMICtal) tablet 25 mg, 25 mg, Oral, Daily, Carlos Page NP, 25 mg at 11/20/18 0833     magnesium hydroxide (MILK OF MAGNESIA) suspension 30 mL, 30 mL, Oral, At Bedtime PRN, Mahsa Iraheta NP     nicotine (NICODERM CQ) 21 MG/24HR 24 hr patch 1 patch, 1 patch, Transdermal, Daily, Carlos Page NP, 1 patch at 11/20/18 0833     nicotine Patch in Place, , Transdermal, Q8H, Carlos Page NP     nicotine patch REMOVAL, , Transdermal, Daily, Carlos Page NP     nicotine polacrilex lozenge 4 mg, 4 mg, Buccal, Q2H PRN, Carlos Page NP     OLANZapine (zyPREXA) tablet 10 mg, 10 mg,  Oral, Q2H PRN **OR** OLANZapine (zyPREXA) injection 10 mg, 10 mg, Intramuscular, Q2H PRN, Mahsa Iraheta NP     prazosin (MINIPRESS) capsule 1 mg, 1 mg, Oral, At Bedtime, Carlos Page NP, 1 mg at 11/19/18 2022     traZODone (DESYREL) tablet 50 mg, 50 mg, Oral, At Bedtime PRN, Mahsa Iraheta NP, 50 mg at 11/19/18 2109

## 2018-11-20 NOTE — PLAN OF CARE
Problem: Patient Care Overview  Goal: Individualization & Mutuality  Patient will be cooperative with assessments  Patient will eat at least 75% meals  Patient will sleep 6-8 hours     Pt is pleasant and cooperative with writer.  States he is having higher anxiety today, pt declines a PRN at this time.  Continues to endorse some depression.  Denies SI, HI, pain, and hallucinations.  States he is a little disappointed that he has to be here so long before going to treatment.   Has been attending groups.  Is appropriate with peers. Has been in lounge most of the shift.  Pt cooperative with all scheduled medications.  Pt requested PRN trazodone 50 mg for sleep and PRN atarax 100 mg for anxiety at 2020.    Problem: Depressive Symptoms  Goal: Depressive Symptoms  Signs and symptoms of listed problems will be absent or manageable.  Patient's depression will be improved by discharge  Patient will remain calm and in control    Patient will verbalize if anxiety and or anger is presenting a problem.   Pt has been calm and cooperative.  Dose continue to endorse depression.

## 2018-11-20 NOTE — PLAN OF CARE
Face to face end of shift report received from Belinda VINCENT RN. Rounding completed. Patient observed in [facility]. Writer is taking over cares of patient until 1400.    Alison Reyna  11/20/2018  12:51 PM

## 2018-11-21 PROCEDURE — 12400011

## 2018-11-21 PROCEDURE — 99233 SBSQ HOSP IP/OBS HIGH 50: CPT | Performed by: NURSE PRACTITIONER

## 2018-11-21 PROCEDURE — 25000132 ZZH RX MED GY IP 250 OP 250 PS 637: Performed by: NURSE PRACTITIONER

## 2018-11-21 RX ORDER — NALTREXONE HYDROCHLORIDE 50 MG/1
50 TABLET, FILM COATED ORAL DAILY
Status: DISCONTINUED | OUTPATIENT
Start: 2018-11-21 | End: 2018-11-21

## 2018-11-21 RX ADMIN — HYDROXYZINE HYDROCHLORIDE 100 MG: 25 TABLET, FILM COATED ORAL at 20:58

## 2018-11-21 RX ADMIN — LAMOTRIGINE 25 MG: 25 TABLET ORAL at 08:17

## 2018-11-21 RX ADMIN — PHENYTOIN 1 MG: 125 SUSPENSION ORAL at 20:37

## 2018-11-21 RX ADMIN — TRAZODONE HYDROCHLORIDE 50 MG: 50 TABLET ORAL at 20:58

## 2018-11-21 RX ADMIN — NICOTINE 1 PATCH: 21 PATCH, EXTENDED RELEASE TRANSDERMAL at 08:17

## 2018-11-21 RX ADMIN — Medication 25 MG: at 20:37

## 2018-11-21 RX ADMIN — HYDROXYZINE HYDROCHLORIDE 100 MG: 25 TABLET, FILM COATED ORAL at 15:42

## 2018-11-21 ASSESSMENT — ACTIVITIES OF DAILY LIVING (ADL)
ORAL_HYGIENE: INDEPENDENT
GROOMING: INDEPENDENT
GROOMING: INDEPENDENT
LAUNDRY: UNABLE TO COMPLETE
DRESS: SCRUBS (BEHAVIORAL HEALTH);INDEPENDENT
LAUNDRY: UNABLE TO COMPLETE
DRESS: SCRUBS (BEHAVIORAL HEALTH);INDEPENDENT
ORAL_HYGIENE: INDEPENDENT

## 2018-11-21 NOTE — PLAN OF CARE
"Problem: Patient Care Overview  Goal: Discharge Needs Assessment  Outcome: No Change  Spoke with Maxine from 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 Will contact pt CM today. Gave pt an update - pt appeared disappointed with not being able to get a hold of his CM due being told it was a holiday weekend and [facility] is closed Thursday and Friday - also explained to pt that [facility] did receive the referral, but again, due to holiday weekend they may not get to it this week.   "

## 2018-11-21 NOTE — PROGRESS NOTES
Face to face end of shift report received from david regalado rn at 2300 report. Rounding completed. Patient observed.     Alida Caceres  11/21/2018  12:57 AM

## 2018-11-21 NOTE — PLAN OF CARE
Problem: Patient Care Overview  Goal: Plan of Care/Patient Progress Review  Outcome: No Change  Discharge Planner OT   Patient plan for discharge: MICD treatment  Current status: pt approached x2 today to address coping skills.  Issued handouts but pt declined completing the coping activity and discussing it.  States that he is ok doing it on his own.  Declined wanting to learn any other coping skills at this time.  Poor eye contact today, fidgety, polite.   Barriers to return to prior living situation: ETOH abuse  Recommendations for discharge: MICD treatment  Rationale for recommendations: anxiety leading to ETOH abuse, anger, shame and guilt       Entered by: Elizabeth Tucker 11/21/2018 2:37 PM     Occupational Therapy Discharge Summary    Reason for therapy discharge:    No further expectations of functional progress.    Progress towards therapy goal(s). See goals on Care Plan in Psychiatric electronic health record for goal details.  Goals partially met.  Barriers to achieving goals:   pt declines to discuss and practice coping skills with therapy. Handouts issued.  .    Therapy recommendation(s):    No further therapy is recommended.

## 2018-11-21 NOTE — PLAN OF CARE
Face to face end of shift report received from Coral AGUILAR RN. Rounding completed. [facility].     Gayatri Gore  11/21/2018  7:47 AM

## 2018-11-21 NOTE — PLAN OF CARE
Problem: Patient Care Overview  Goal: Team Discussion  Team Plan:   BEHAVIORAL TEAM DISCUSSION    Participants: Naina Martinez NP, Elizabeth Scott NP,  Yara Kaiser NP, Veronica Oconnor LSW,  Zenaida Willams LSW,  Vanessa Watson RN, Reyna Hankins OT,Elizabeth Wilson OT  Progress: Good- Attends unit programming   Continued Stay Criteria/Rationale: NP to discuss Naltrexone with pt  Medical/Physical: Crohns Disease  Precautions:   Behavioral Orders   Procedures     Code 1 - Restrict to Unit     Routine Programming     As clinically indicated     Self Injury Precaution     Status 15     Every 15 minutes.     Plan: Revoked, On list for [facility], Referral sent to [facility]   Rationale for change in precautions or plan: None       Current Facility-Administered Medications:      acetaminophen (TYLENOL) tablet 650 mg, 650 mg, Oral, Q4H PRN, Mahsa Iraheta NP     alum & mag hydroxide-simethicone (MYLANTA ES/MAALOX  ES) suspension 30 mL, 30 mL, Oral, Q4H PRN, Mahsa Iraheta NP     bisacodyl (DULCOLAX) Suppository 10 mg, 10 mg, Rectal, Daily PRN, Mahsa Iraheta NP     hydrOXYzine (ATARAX) tablet  mg,  mg, Oral, Q4H PRN, Carlos Page NP, 100 mg at 11/20/18 2020     lamoTRIgine (LaMICtal) tablet 25 mg, 25 mg, Oral, Daily, Carlos Page NP, 25 mg at 11/21/18 0817     magnesium hydroxide (MILK OF MAGNESIA) suspension 30 mL, 30 mL, Oral, At Bedtime PRN, Mahsa Iraheta, NP     nicotine (NICODERM CQ) 21 MG/24HR 24 hr patch 1 patch, 1 patch, Transdermal, Daily, Carlos Page NP, 1 patch at 11/21/18 0817     nicotine Patch in Place, , Transdermal, Q8H, Carlos Page NP     nicotine patch REMOVAL, , Transdermal, Daily, Carlos Page NP     nicotine polacrilex lozenge 4 mg, 4 mg, Buccal, Q2H PRN, Carlos Page NP     OLANZapine (zyPREXA) tablet 10 mg, 10 mg, Oral, Q2H PRN **OR** OLANZapine (zyPREXA) injection 10 mg, 10 mg, Intramuscular, Q2H PRN, Mahsa Iraheta, NP     prazosin (MINIPRESS) capsule  1 mg, 1 mg, Oral, At Bedtime, Carlos Page NP, 1 mg at 11/20/18 2020     traZODone (DESYREL) tablet 50 mg, 50 mg, Oral, At Bedtime PRN, Mahsa Iraheta NP, 50 mg at 11/20/18 2020     Patient Active Problem List   Diagnosis     Psychosis (H)

## 2018-11-21 NOTE — PLAN OF CARE
Problem: Patient Care Overview  Goal: Individualization & Mutuality  Patient will be cooperative with assessments  Patient will eat at least 75% meals  Patient will sleep 6-8 hours     0030 in bed with easy respirations, presenting sleeping.0518 patient continues to sleep at this time.    Problem: Depressive Symptoms  Goal: Depressive Symptoms  Signs and symptoms of listed problems will be absent or manageable.  Patient's depression will be improved by discharge  Patient will remain calm and in control    Patient will verbalize if anxiety and or anger is presenting a problem.   0030 in bed with easy respirations, presenting sleeping.

## 2018-11-21 NOTE — PLAN OF CARE
Face to face end of shift report received from Gayatri CHOI. Rounding completed. Patient observed in dayroom and introduced to nursing for the shift.    Jose Feng  11/21/2018  3:53 PM

## 2018-11-21 NOTE — PROGRESS NOTES
"Spoke with pt about his placement and he reported being \"Ready to leave\" this facility. Pt asked if I could call facility and see how long it would be before a bed opens.  Pt was very personable and polite during our short conversation.  "

## 2018-11-21 NOTE — PLAN OF CARE
Problem: Patient Care Overview  Goal: Individualization & Mutuality  Patient will be cooperative with assessments  Patient will eat at least 75% meals  Patient will sleep 6-8 hours     Outcome: Improving  Pt is cooperative with nursing assessment , he states he has continued depression. He is up on the unit, social with peers, he attends groups, maintains eye contact during conversation. Pt is compliant with medications.     Problem: Depressive Symptoms  Goal: Depressive Symptoms  Signs and symptoms of listed problems will be absent or manageable.  Patient's depression will be improved by discharge  Patient will remain calm and in control    Patient will verbalize if anxiety and or anger is presenting a problem.   Outcome: Improving  Pt denies anger today, he is calm and cooperative throughout nursing assessment.

## 2018-11-22 PROCEDURE — 25000132 ZZH RX MED GY IP 250 OP 250 PS 637: Performed by: NURSE PRACTITIONER

## 2018-11-22 PROCEDURE — 12400011

## 2018-11-22 RX ADMIN — Medication 25 MG: at 08:24

## 2018-11-22 RX ADMIN — HYDROXYZINE HYDROCHLORIDE 100 MG: 25 TABLET, FILM COATED ORAL at 08:24

## 2018-11-22 RX ADMIN — TRAZODONE HYDROCHLORIDE 50 MG: 50 TABLET ORAL at 20:40

## 2018-11-22 RX ADMIN — LAMOTRIGINE 25 MG: 25 TABLET ORAL at 08:24

## 2018-11-22 RX ADMIN — NICOTINE 1 PATCH: 21 PATCH, EXTENDED RELEASE TRANSDERMAL at 09:22

## 2018-11-22 RX ADMIN — PHENYTOIN 1 MG: 125 SUSPENSION ORAL at 20:40

## 2018-11-22 ASSESSMENT — ACTIVITIES OF DAILY LIVING (ADL)
LAUNDRY: UNABLE TO COMPLETE
ORAL_HYGIENE: INDEPENDENT
LAUNDRY: UNABLE TO COMPLETE
GROOMING: INDEPENDENT
DRESS: INDEPENDENT;SCRUBS (BEHAVIORAL HEALTH)
GROOMING: INDEPENDENT
DRESS: INDEPENDENT
ORAL_HYGIENE: INDEPENDENT

## 2018-11-22 NOTE — PLAN OF CARE
"Problem: Patient Care Overview  Goal: Individualization & Mutuality  Patient will be cooperative with assessments  Patient will eat at least 75% meals  Patient will sleep 6-8 hours     Outcome: Improving  Pt is up in the lounge socializing with peers when nurse arrives to this shift. He reports anxiety 5/10 and depression 5/10 stating he is feeling down and anxious about being here on Thanksgiving. Pt states he plans to attend groups today. He does receive a handout on naltrexone. Pt approaches nurse and questions if this medication will decrease his appetite as he as already noticed he has been eating less then he normally does. He states \"I may just be tired but, with crones disease I really don't want to loose anymore weight.\" nurse does pass this on to his practitioner via practitioner notes.     Problem: Depressive Symptoms  Goal: Depressive Symptoms  Signs and symptoms of listed problems will be absent or manageable.  Patient's depression will be improved by discharge  Patient will remain calm and in control    Patient will verbalize if anxiety and or anger is presenting a problem.   Outcome: Improving  Pt is calm and cooperative, he is medication compliant. Pt is calm and cooperative.       "

## 2018-11-22 NOTE — PROGRESS NOTES
[facility]  Psychiatric Progress Note      Impression:     This is my first meeting with Carlo.  He explains to me how frustrated he is regarding the revocation of his stay of commitment.  He tells me that he went into the emergency room on a commitment own accord asking for help and believes that he should not be revoked.  At this time I do tend to agree with a voluntary return of his stay of commitment as long as he followed recommendations which he is willing to do.  Currently the plan is for him to go to treatment, inpatient at [facility].  He feels that this will be the best fit as he does have a traumatic brain injury.  He states that he did not notice any improvement in his anger and anxiety with the use of Depakote.  I did tell him that benzodiazepines would not be used though are an effective treatment for short-term anxiety but due to his substance abuse and the need for treatment, I would not be ordering it for him.  He does state that clonazepam has been effective in the past.  I did talk to him about Risperdal being an option for anger management secondary to brain injury.  He has not tried Tegretol and it may be more beneficial metabolically to avoid neuroleptics unless necessary.  He denies any suicidal thoughts or homicidal thoughts.  He does appear to be anxious and a bit agitated.  He states that he is sleeping better.  No alcohol withdrawal symptoms and is social with others on the unit.           DIagnoses:     Major Depressive Disorder, Recurrent, Severe without psychosis  PTSD  Alcohol use disorder, moderate, dependence w/o withdrawal  Traumatic Brain Injury, unspecified    Attestation:  Patient has been seen and evaluated by me,  Naina Martinez NP          Interim History:   The patient's care was discussed with the treatment team and chart notes were reviewed.          Medications:   I have reviewed this patient's current medications  Prescription  "Medications as of 11/21/2018             divalproex sodium delayed-release (DEPAKOTE) 500 MG DR tablet Take 500 mg by mouth daily Take one tab daily in the AM    divalproex sodium delayed-release (DEPAKOTE) 500 MG DR tablet Take 1,000 mg by mouth At Bedtime    OLANZAPINE PO Take 10 mg by mouth 2 times daily      Facility Administered Medications as of 11/21/2018             acetaminophen (TYLENOL) tablet 650 mg Take 2 tablets (650 mg) by mouth every 4 hours as needed for mild pain    alum & mag hydroxide-simethicone (MYLANTA ES/MAALOX  ES) suspension 30 mL Take 30 mLs by mouth every 4 hours as needed for indigestion    bisacodyl (DULCOLAX) Suppository 10 mg Place 1 suppository (10 mg) rectally daily as needed for constipation    hydrOXYzine (ATARAX) tablet  mg Take 2-4 tablets ( mg) by mouth every 4 hours as needed for anxiety    lamoTRIgine (LaMICtal) tablet 25 mg Take 1 tablet (25 mg) by mouth daily    magnesium hydroxide (MILK OF MAGNESIA) suspension 30 mL Take 30 mLs by mouth nightly as needed for constipation    naltrexone (DEPADE;REVIA) half-tab 25 mg Take 1 half-tab (25 mg) by mouth daily    nicotine (NICODERM CQ) 21 MG/24HR 24 hr patch 1 patch Place 1 patch onto the skin daily    nicotine Patch in Place Place onto the skin every 8 hours    nicotine patch REMOVAL Place onto the skin daily    nicotine polacrilex lozenge 4 mg Place 1 lozenge (4 mg) inside cheek every 2 hours as needed for smoking cessation    OLANZapine (zyPREXA) injection 10 mg Inject 10 mg into the muscle every 2 hours as needed for agitation (associated with psychosis or cassi)    Linked Group 1:  \"Or\" Linked Group Details     OLANZapine (zyPREXA) tablet 10 mg Take 1 tablet (10 mg) by mouth every 2 hours as needed for agitation (associated with psychosis or cassi)    Linked Group 1:  \"Or\" Linked Group Details     prazosin (MINIPRESS) capsule 1 mg Take 1 capsule (1 mg) by mouth At Bedtime    traZODone (DESYREL) tablet 50 mg Take 1 " "tablet (50 mg) by mouth nightly as needed for sleep    naltrexone (DEPADE;REVIA) tablet 50 mg (Discontinued) Take 1 tablet (50 mg) by mouth daily        10 point ROS negative        Allergies:   No Known Allergies         Psychiatric Examination:   /69  Pulse 78  Temp 98.9  F (37.2  C) (Tympanic)  Resp 16  Ht 1.727 m (5' 8\")  Wt 72.1 kg (158 lb 14.4 oz)  SpO2 99%  BMI 24.16 kg/m2  Weight is 158 lbs 14.4 oz  Body mass index is 24.16 kg/(m^2).    Appearance:  awake, alert and adequately groomed  Attitude:  cooperative  Eye Contact:  good  Mood:  depressed  Affect:  mood congruent anxious and irritable at times  Speech:  clear, coherent  Psychomotor Behavior:  no evidence of tardive dyskinesia, dystonia, or tics  Thought Process:  logical, linear and goal oriented  Associations:  no loose associations  Thought Content:  no evidence of suicidal ideation or homicidal ideation and no evidence of psychotic thought  Insight:  good  Judgment:  intact  Oriented to:  time, person, and place  Attention Span and Concentration:  intact  Recent and Remote Memory:  intact  Fund of Knowledge: appropriate  Muscle Strength and Tone: normal  Gait and Station: Normal           Labs:   No labs today           Plan:   Start naltrexone 25 mg daily then increase to 50 mg   Will look into tegretol for anger and agitation secondary to TBI vs risperdal  I did give him the number for the [employer] which she was appreciative of.  We will likely speak with Dr. Michelle tomorrow about Tegretol or Risperdal use in people with traumatic brain injury for anger management  ELOS dependent on placement. Discharge to lower level of care has proven high risk secondary to alcohol relapse, mood lability, and suicidal threats.  "

## 2018-11-22 NOTE — PLAN OF CARE
Problem: Patient Care Overview  Goal: Individualization & Mutuality  Patient will be cooperative with assessments  Patient will eat at least 75% meals  Patient will sleep 6-8 hours     0015 in bed with easy respirations, presenting sleeping. 0535 patient continues to sleep at this time. Was awake this shift a few times with no complaints or requests.  Problem: Depressive Symptoms  Goal: Depressive Symptoms  Signs and symptoms of listed problems will be absent or manageable.  Patient's depression will be improved by discharge  Patient will remain calm and in control    Patient will verbalize if anxiety and or anger is presenting a problem.   0015 in bed with easy respirations, presenting sleeping.

## 2018-11-22 NOTE — PLAN OF CARE
Face to face end of shift report received from Coral AGUILAR RN. Rounding completed. Patient observed in lounge talking with peers.     Gayatri Gore  11/22/2018  8:05 AM

## 2018-11-22 NOTE — PLAN OF CARE
Problem: Patient Care Overview  Goal: Individualization & Mutuality  Patient will be cooperative with assessments  Patient will eat at least 75% meals  Patient will sleep 6-8 hours     Outcome: No Change  Pt attended to available groups this evening and spent time socializing with peers in the dayroom.  He told me that he is really struggling with coping with not knowing when he is leaving and not getting to speak to his .  Pt received PRN Atarax twice this shift for his anxiety and trazodone at bedtime. Pt reported minimal benefit from the PRN's. He was encouraged to focus on the present and stay active with distracting activities.     Problem: Depressive Symptoms  Goal: Depressive Symptoms  Signs and symptoms of listed problems will be absent or manageable.  Patient's depression will be improved by discharge  Patient will remain calm and in control    Patient will verbalize if anxiety and or anger is presenting a problem.   Outcome: No Change  Pt reports his depression and anxiety  is getting worse due to having to wait so long to get into treatment  Goal: Social and Therapeutic (Depression)  Signs and symptoms of listed problems will be absent or manageable.   Outcome: No Change  Pt has been cooperative with treatment

## 2018-11-22 NOTE — PLAN OF CARE
Face to face end of shift report received from Gayatri CHOI. Rounding completed. Patient observed in dayroom and introduced to nursing for the shift    Jose Feng  11/22/2018  [id number]

## 2018-11-22 NOTE — PROGRESS NOTES
Face to face end of shift report received from chio evangelista at 2300 report.. Rounding completed. Patient observed.     Alida Caceres  11/22/2018  12:43 AM

## 2018-11-23 LAB
GAMMA INTERFERON BACKGROUND BLD IA-ACNC: 0.02 IU/ML
M TB IFN-G BLD-IMP: NEGATIVE
M TB IFN-G CD4+ BCKGRND COR BLD-ACNC: >10 IU/ML
MITOGEN IGNF BCKGRD COR BLD-ACNC: 0.01 IU/ML
MITOGEN IGNF BCKGRD COR BLD-ACNC: 0.01 IU/ML

## 2018-11-23 PROCEDURE — 99232 SBSQ HOSP IP/OBS MODERATE 35: CPT | Performed by: NURSE PRACTITIONER

## 2018-11-23 PROCEDURE — 12400011

## 2018-11-23 PROCEDURE — 25000132 ZZH RX MED GY IP 250 OP 250 PS 637: Performed by: NURSE PRACTITIONER

## 2018-11-23 RX ORDER — BUSPIRONE HYDROCHLORIDE 5 MG/1
5 TABLET ORAL 3 TIMES DAILY
Status: DISCONTINUED | OUTPATIENT
Start: 2018-11-23 | End: 2018-11-25

## 2018-11-23 RX ADMIN — NICOTINE 1 PATCH: 21 PATCH, EXTENDED RELEASE TRANSDERMAL at 08:29

## 2018-11-23 RX ADMIN — ACETAMINOPHEN 650 MG: 325 TABLET, FILM COATED ORAL at 13:07

## 2018-11-23 RX ADMIN — ACETAMINOPHEN 650 MG: 325 TABLET, FILM COATED ORAL at 19:01

## 2018-11-23 RX ADMIN — LAMOTRIGINE 25 MG: 25 TABLET ORAL at 08:29

## 2018-11-23 RX ADMIN — HYDROXYZINE HYDROCHLORIDE 100 MG: 25 TABLET, FILM COATED ORAL at 13:07

## 2018-11-23 RX ADMIN — PHENYTOIN 1 MG: 125 SUSPENSION ORAL at 20:36

## 2018-11-23 RX ADMIN — BUSPIRONE HYDROCHLORIDE 5 MG: 5 TABLET ORAL at 20:36

## 2018-11-23 RX ADMIN — TRAZODONE HYDROCHLORIDE 50 MG: 50 TABLET ORAL at 20:36

## 2018-11-23 RX ADMIN — BUSPIRONE HYDROCHLORIDE 5 MG: 5 TABLET ORAL at 13:07

## 2018-11-23 ASSESSMENT — ACTIVITIES OF DAILY LIVING (ADL)
GROOMING: INDEPENDENT
ORAL_HYGIENE: INDEPENDENT
LAUNDRY: UNABLE TO COMPLETE
DRESS: INDEPENDENT;SCRUBS (BEHAVIORAL HEALTH)
GROOMING: INDEPENDENT
ORAL_HYGIENE: INDEPENDENT
DRESS: SCRUBS (BEHAVIORAL HEALTH);INDEPENDENT
LAUNDRY: UNABLE TO COMPLETE

## 2018-11-23 NOTE — PLAN OF CARE
Problem: Patient Care Overview  Goal: Individualization & Mutuality  Patient will be cooperative with assessments  Patient will eat at least 75% meals  Patient will sleep 6-8 hours     Pt is pleasant and cooperative with writer.  He is cooperative with nursing assessment.  States he is feeling very depressed and is having some anxiety this shift.  States that his appetite has been poor. Pt refused his scheduled naltrexone because he feels this is affecting his appetite. NP updated and aware.  Pt has been isolative to his room most of the did.  He did eat breakfast but refused lunch stating that he wasn't hunergy.  Pt c/o a headache and high anxiety.  He requested PRN tylenol 650 mg and PRN atarax 100 mg at 1307.    Problem: Depressive Symptoms  Goal: Depressive Symptoms  Signs and symptoms of listed problems will be absent or manageable.  Patient's depression will be improved by discharge  Patient will remain calm and in control    Patient will verbalize if anxiety and or anger is presenting a problem.   Pt has been calm and cooperative and has displayed appopirate behavior. Pt continues to endorse depression.

## 2018-11-23 NOTE — PLAN OF CARE
Problem: Patient Care Overview  Goal: Individualization & Mutuality  Patient will be cooperative with assessments  Patient will eat at least 75% meals  Patient will sleep 6-8 hours     Outcome: No Change  He was not feeling well at the beginning of the shift reporting feeling a bit dizzy. He did not eat much breakfast or lunch today.  He was given some teaching on the Naltrexone and explained that the April had read that Naltrexone can be beneficial for Chron's disease.     Pt ate most of his meal at supper though he is not feeling well. He spent time in the dayroom watching TV with peers though is not socializing with peers. He told me that he feels very depressed and powerless.     Problem: Depressive Symptoms  Goal: Depressive Symptoms  Signs and symptoms of listed problems will be absent or manageable.  Patient's depression will be improved by discharge  Patient will remain calm and in control    Patient will verbalize if anxiety and or anger is presenting a problem.   Outcome: Declining  Pt reports that he feels very depressed, powerless, and is struggling with anxiety.  Goal: Social and Therapeutic (Depression)  Signs and symptoms of listed problems will be absent or manageable.   Outcome: Declining  Pt has been withdrawn to his room in bed most of the shift

## 2018-11-23 NOTE — PLAN OF CARE
Problem: Patient Care Overview  Goal: Individualization & Mutuality  Patient will be cooperative with assessments  Patient will eat at least 75% meals  Patient will sleep 6-8 hours     0030 in bed with easy respirations, presenting sleeping.0519 patient continues to sleep at this time.    Problem: Depressive Symptoms  Goal: Social and Therapeutic (Depression)  Signs and symptoms of listed problems will be absent or manageable.   0030 in bed with easy respirations, presenting sleeping.

## 2018-11-23 NOTE — PLAN OF CARE
Face to face end of shift report received from Padmaja CHOI. Rounding completed. Patient observed in bed and felecia Feng  11/23/2018  4:22 PM

## 2018-11-23 NOTE — PROGRESS NOTES
Face to face end of shift report received from chio chaudhary rn at 2300 report.. Rounding completed. Patient observed.     Alida Caceres  11/23/2018  1:01 AM

## 2018-11-23 NOTE — PLAN OF CARE
Problem: Patient Care Overview  Goal: Team Discussion  Team Plan:   BEHAVIORAL TEAM DISCUSSION    Participants: Elizabeth Scott NP, Yara Kaiser NP, Sindhu Roa [facility], Laura Portillo RN, Padmaja Cabello RN  Progress: good  Continued Stay Criteria/Rationale: continue current regimen  Medical/Physical: Crohn's disease  Precautions:   Behavioral Orders   Procedures     Code 1 - Restrict to Unit     Routine Programming     As clinically indicated     Self Injury Precaution     Status 15     Every 15 minutes.     Plan: revoked P.D., referral to [place], list for [facility]  Rationale for change in precautions or plan: none    Patient Active Problem List   Diagnosis     Psychosis (H)       Current Facility-Administered Medications:      acetaminophen (TYLENOL) tablet 650 mg, 650 mg, Oral, Q4H PRN, Mahsa Iraheta NP     alum & mag hydroxide-simethicone (MYLANTA ES/MAALOX  ES) suspension 30 mL, 30 mL, Oral, Q4H PRN, Mahsa Iraheta NP     bisacodyl (DULCOLAX) Suppository 10 mg, 10 mg, Rectal, Daily PRN, Mahsa Iraheta NP     hydrOXYzine (ATARAX) tablet  mg,  mg, Oral, Q4H PRN, Carlos Page NP, 100 mg at 11/22/18 0824     lamoTRIgine (LaMICtal) tablet 25 mg, 25 mg, Oral, Daily, Carlos Page NP, 25 mg at 11/23/18 0829     magnesium hydroxide (MILK OF MAGNESIA) suspension 30 mL, 30 mL, Oral, At Bedtime PRN, Mahsa Iraheta NP     naltrexone (DEPADE;REVIA) half-tab 25 mg, 25 mg, Oral, Daily, Martinez, April Lesley, NP, 25 mg at 11/22/18 0824     nicotine (NICODERM CQ) 21 MG/24HR 24 hr patch 1 patch, 1 patch, Transdermal, Daily, Carlos Page NP, 1 patch at 11/23/18 0829     nicotine Patch in Place, , Transdermal, Q8H, Carlos Page NP     nicotine patch REMOVAL, , Transdermal, Daily, Carlos Page NP     nicotine polacrilex lozenge 4 mg, 4 mg, Buccal, Q2H PRN, Carlos Page, NP     OLANZapine (zyPREXA) tablet 10 mg, 10 mg, Oral, Q2H PRN **OR** OLANZapine (zyPREXA) injection  10 mg, 10 mg, Intramuscular, Q2H PRN, Mahsa Iraheta NP     prazosin (MINIPRESS) capsule 1 mg, 1 mg, Oral, At Bedtime, Carlos Page NP, 1 mg at 11/22/18 2040     traZODone (DESYREL) tablet 50 mg, 50 mg, Oral, At Bedtime PRN, Mahsa Iraheta NP, 50 mg at 11/22/18 2040

## 2018-11-23 NOTE — PLAN OF CARE
Face to face end of shift report received from Alida AGUILAR RN. Rounding completed. [place] [facility]     Padmaja Cabello  11/23/2018  8:56 AM

## 2018-11-23 NOTE — PROGRESS NOTES
"[facility]  Psychiatric Progress Note    Subjective   This is a 28 year old male with MDD, recurrent/severe/w/o psychosis, PTSD, ETOH use disorder, moderate, dependence w/o withdrawal, and TBI. Pt c/o loss of appetite since starting on naltrexone- he has been refusing to take it therefore it will be discontinued. Reports today that anxiety is problematic, discussed addition of buspar which he would like to pursue.He is out on the unit conversing with others. Denies any sx of withdrawal.     10 point ROS negative other than what is noted in HPI.       DIagnoses:   MDD, Recurrent/Severe w/o psychosis  PTSD  Alcohol Use Disorder/Moderate/Dependence w/o withdrawal  TBI, unspecified    Attestation:  Patient has been seen and evaluated by me,  YARED Lawton CNP          Interim History:   The patient's care was discussed with the treatment team and chart notes were reviewed.          Medications:       busPIRone  5 mg Oral TID     lamoTRIgine  25 mg Oral Daily     nicotine  1 patch Transdermal Daily     nicotine   Transdermal Q8H     nicotine   Transdermal Daily     prazosin  1 mg Oral At Bedtime     acetaminophen, alum & mag hydroxide-simethicone, bisacodyl, hydrOXYzine, magnesium hydroxide, nicotine polacrilex, OLANZapine **OR** OLANZapine, traZODone          Allergies:   No Known Allergies         Psychiatric Examination:   /67  Pulse 80  Temp 97.6  F (36.4  C) (Tympanic)  Resp 16  Ht 1.727 m (5' 8\")  Wt 72.1 kg (158 lb 14.4 oz)  SpO2 96%  BMI 24.16 kg/m2  Weight is 158 lbs 14.4 oz  Body mass index is 24.16 kg/(m^2).    Appearance:  awake, alert and dressed in hospital scrubs  Attitude:  cooperative  Eye Contact:  fair  Mood:  anxious  Affect:  mood congruent  Speech:  clear, coherent  Psychomotor Behavior:  no evidence of tardive dyskinesia, dystonia, or tics and intact station, gait and muscle tone  Thought Process:  logical, linear and goal oriented  Associations:  no loose " associations  Thought Content:  no evidence of suicidal ideation or homicidal ideation  Insight:  good  Judgment:  intact  Oriented to:  time, person, and place  Attention Span and Concentration:  intact  Recent and Remote Memory:  intact  Fund of Knowledge: appropriate  Muscle Strength and Tone: normal  Gait and Station: Normal  Perception: no perceptual disorder noted.         Labs:   No results found for this or any previous visit (from the past 24 hour(s)).          Assessment/ Plan:    Begin Buspar 5 mg TID.  Discontinue Naltrexone.         For all new medications pt was instructed on the drug, dose, route, action and potential side effects. Pt verbalized understanding.

## 2018-11-24 PROCEDURE — 25000132 ZZH RX MED GY IP 250 OP 250 PS 637: Performed by: NURSE PRACTITIONER

## 2018-11-24 PROCEDURE — 99232 SBSQ HOSP IP/OBS MODERATE 35: CPT | Performed by: NURSE PRACTITIONER

## 2018-11-24 PROCEDURE — 12400011

## 2018-11-24 RX ADMIN — TRAZODONE HYDROCHLORIDE 50 MG: 50 TABLET ORAL at 20:44

## 2018-11-24 RX ADMIN — BUSPIRONE HYDROCHLORIDE 5 MG: 5 TABLET ORAL at 13:27

## 2018-11-24 RX ADMIN — BUSPIRONE HYDROCHLORIDE 5 MG: 5 TABLET ORAL at 08:29

## 2018-11-24 RX ADMIN — BUSPIRONE HYDROCHLORIDE 5 MG: 5 TABLET ORAL at 20:44

## 2018-11-24 RX ADMIN — OLANZAPINE 10 MG: 10 TABLET ORAL at 15:55

## 2018-11-24 RX ADMIN — PHENYTOIN 1 MG: 125 SUSPENSION ORAL at 20:44

## 2018-11-24 ASSESSMENT — ACTIVITIES OF DAILY LIVING (ADL)
DRESS: INDEPENDENT;SCRUBS (BEHAVIORAL HEALTH)
LAUNDRY: UNABLE TO COMPLETE
LAUNDRY: UNABLE TO COMPLETE
GROOMING: INDEPENDENT
ORAL_HYGIENE: INDEPENDENT
ORAL_HYGIENE: INDEPENDENT
GROOMING: INDEPENDENT
DRESS: SCRUBS (BEHAVIORAL HEALTH)

## 2018-11-24 NOTE — PLAN OF CARE
Problem: Patient Care Overview  Goal: Individualization & Mutuality  Patient will be cooperative with assessments  Patient will eat at least 75% meals  Patient will sleep 6-8 hours     Outcome: Improving  Pt ate 75% of the supper meal and did have snack. He was happy to hear that the Rule 25 results were faxed to medical records today and are scanned into his medical record here. He talked to staff at [facility] and they are supposed to send the results to [facility] as well. Pt told me that he still feels very depressed but does have some hope that he can get into treatment soon.  Pt did sleep about the first half of the shift and watched TV the second half.    Problem: Depressive Symptoms  Goal: Depressive Symptoms  Signs and symptoms of listed problems will be absent or manageable.  Patient's depression will be improved by discharge  Patient will remain calm and in control    Patient will verbalize if anxiety and or anger is presenting a problem.   Outcome: Improving  Pt reports his depression is unchanged, denies anxiety, and has controlled his anger  Goal: Social and Therapeutic (Depression)  Signs and symptoms of listed problems will be absent or manageable.   Outcome: Improving  Pt has been withdrawn to himself most of the evening.  He did not attend any of the available groups

## 2018-11-24 NOTE — PLAN OF CARE
Face to face end of shift report received from Caitlyn CHOI. Rounding completed. Patient observed in his room and introduced to nursing for the shift.    Jose Feng  11/24/2018  [id number]

## 2018-11-24 NOTE — PLAN OF CARE
Face to face end of shift report received from Hussain HUDSON RN. Rounding completed. Patient observed.     Caitlyn Quiles  11/24/2018  10:02 AM

## 2018-11-24 NOTE — PLAN OF CARE
"Problem: Patient Care Overview  Goal: Individualization & Mutuality  Patient will be cooperative with assessments  Patient will eat at least 75% meals  Patient will sleep 6-8 hours     Outcome: Declining  Pt began the shift very upset and shaking saying, \"I can't fucking take it!  My girlfriend is selling stuff to pay the bills and I am stuck here!  I should have been in treatment last week!  My  fucked this all up!\"  I talked with Pt quiet and softly validating his feelings and concerns.  I offered Pt Zyprexa for his agitation which he took.  Pt is very frustrated, feels severely depressed, and hopeless.  He said that he is willing to take an anti-depressant but that he wants something that will work faster.  Pt was able to calm himself down rapidly and sat in the dayroom after our discussion.  Pt reported that the PRN Zyprexa helped him a great deal. He told me that he is really trying to stay hopeful even though he feels very powerless.    Pt given PRN Trazedone at HS    Problem: Depressive Symptoms  Goal: Social and Therapeutic (Depression)  Signs and symptoms of listed problems will be absent or manageable.   Outcome: Declining  Pt reports his depression is severe      "

## 2018-11-24 NOTE — PLAN OF CARE
Face to face end of shift report received from pm RN. Rounding completed. Patient observed.     Hussain Vásquez  11/24/2018  12:02 AM

## 2018-11-24 NOTE — PLAN OF CARE
"Problem: Patient Care Overview  Goal: Individualization & Mutuality  Patient will be cooperative with assessments  Patient will eat at least 75% meals  Patient will sleep 6-8 hours     Outcome: No Change  Patient denies SI, HI, carrera. He contracts for safety. He admits to depression and anxiety. He declines any PRN medications for anxiety stating that \"there's nothing that you can give me that will work\". Patient states that he has been feeling dizzy, light headed and unsteady for the past few days, even when he is just sitting or laying. He feels that this is from the lamictal. \"I don't know why they put me on an anti-seizure medication. I don't have seizures. I don't want to have any more med changes here. I just want to wait until I'm at treatment where I have one provider instead of 5. I shouldn't even be here\". This writer provided some teaching on lamictal and buspar. Patient willing to take buspar at this time but refused the lamictal. This writer encouraged him to drink water and to rise slowly. Provider updated on patient's condition.   1000: Patient states that he is feeling less dizzy, unsteady and light headed. He has been resting in his bed this morning.  1330: Patient is irritable this afternoon because he has not seen the nurse practitioner yet. He states that he is no longer dizzy, unsteady, or light headed.    Problem: Depressive Symptoms  Goal: Depressive Symptoms  Signs and symptoms of listed problems will be absent or manageable.  Patient's depression will be improved by discharge  Patient will remain calm and in control    Patient will verbalize if anxiety and or anger is presenting a problem.   Outcome: No Change  Patient admits to depression and anxiety. He is irritable when talking about his medications. He refuses his lamictal this morning. He has been in control of his behaviors so far this shift. Will continue to monitor.      "

## 2018-11-24 NOTE — PROGRESS NOTES
"[facility]  Psychiatric Progress Note    Subjective   This is a 28 year old male with MDD, recurrent/severe/w/o psychosis, PTSD, ETOH use disorder, moderate, dependence w/o withdrawal, and TBI. Pt today was irritated when I met with him as \"it was late in the day\" . He is refusing to take the lamictal citing light headedness. He is upset with me because I had not informed him buspar would like 3 weeks to work, I attempted to explain this further when he stated \"I'm just not taking anything because nothing works\". Offered a trial of trazadone or remeron  Which only further increased his agitation. Pt was out in the day room, starig at a blank TV and not conversing with others. After provider left he became agitated and went to his room.  10 point ROS negative other than what is noted in HPI.       DIagnoses:   MDD, Recurrent/Severe w/o psychosis  PTSD  Alcohol Use Disorder/Moderate/Dependence w/o withdrawal  TBI, unspecified    Attestation:  Patient has been seen and evaluated by me,  YARED Lawton CNP          Interim History:   The patient's care was discussed with the treatment team and chart notes were reviewed.          Medications:       busPIRone  5 mg Oral TID     lamoTRIgine  25 mg Oral Daily     nicotine  1 patch Transdermal Daily     nicotine   Transdermal Q8H     nicotine   Transdermal Daily     prazosin  1 mg Oral At Bedtime     acetaminophen, alum & mag hydroxide-simethicone, bisacodyl, hydrOXYzine, magnesium hydroxide, nicotine polacrilex, OLANZapine **OR** OLANZapine, traZODone          Allergies:   No Known Allergies         Psychiatric Examination:   BP 98/55  Pulse 80  Temp 97.4  F (36.3  C) (Tympanic)  Resp 16  Ht 1.727 m (5' 8\")  Wt 72.1 kg (158 lb 14.4 oz)  SpO2 95%  BMI 24.16 kg/m2  Weight is 158 lbs 14.4 oz  Body mass index is 24.16 kg/(m^2).    Appearance:  awake, alert and dressed in hospital scrubs  Attitude:  agitated  Eye Contact:  poor   Mood:  " anxious  Affect:  mood congruent  Speech:  clear, coherent  Psychomotor Behavior:  no evidence of tardive dyskinesia, dystonia, or tics and intact station, gait and muscle tone  Thought Process:  goal oriented  Associations:  no loose associations  Thought Content:  no evidence of suicidal ideation or homicidal ideation  Insight:  fair  Judgment:  fair  Oriented to:  time, person, and place  Attention Span and Concentration:  intact  Recent and Remote Memory:  intact  Fund of Knowledge: appropriate  Muscle Strength and Tone: normal  Gait and Station: Normal  Perception: no perceptual disorder noted.         Labs:   No results found for this or any previous visit (from the past 24 hour(s)).          Assessment/ Plan:    Continue on current medications. I am not going to discontinue buspar so that it is available in the event he changes his mind about taking it.  Discontinue Lamictal         For all new medications pt was instructed on the drug, dose, route, action and potential side effects. Pt verbalized understanding.

## 2018-11-25 PROCEDURE — 99232 SBSQ HOSP IP/OBS MODERATE 35: CPT | Performed by: NURSE PRACTITIONER

## 2018-11-25 PROCEDURE — 25000132 ZZH RX MED GY IP 250 OP 250 PS 637: Performed by: NURSE PRACTITIONER

## 2018-11-25 PROCEDURE — 12400011

## 2018-11-25 RX ADMIN — BUSPIRONE HYDROCHLORIDE 5 MG: 5 TABLET ORAL at 08:31

## 2018-11-25 RX ADMIN — Medication 7.5 MG: at 20:47

## 2018-11-25 RX ADMIN — Medication 7.5 MG: at 13:26

## 2018-11-25 RX ADMIN — PHENYTOIN 1 MG: 125 SUSPENSION ORAL at 20:47

## 2018-11-25 RX ADMIN — TRAZODONE HYDROCHLORIDE 50 MG: 50 TABLET ORAL at 22:02

## 2018-11-25 RX ADMIN — NICOTINE 1 PATCH: 21 PATCH, EXTENDED RELEASE TRANSDERMAL at 08:30

## 2018-11-25 RX ADMIN — ACETAMINOPHEN 650 MG: 325 TABLET, FILM COATED ORAL at 22:02

## 2018-11-25 RX ADMIN — TRAZODONE HYDROCHLORIDE 50 MG: 50 TABLET ORAL at 20:47

## 2018-11-25 ASSESSMENT — ACTIVITIES OF DAILY LIVING (ADL)
LAUNDRY: UNABLE TO COMPLETE
DRESS: SCRUBS (BEHAVIORAL HEALTH);INDEPENDENT
ORAL_HYGIENE: INDEPENDENT
GROOMING: INDEPENDENT
ORAL_HYGIENE: INDEPENDENT
GROOMING: INDEPENDENT
LAUNDRY: UNABLE TO COMPLETE
DRESS: INDEPENDENT;SCRUBS (BEHAVIORAL HEALTH)

## 2018-11-25 NOTE — PROGRESS NOTES
"[facility]  Psychiatric Progress Note    Subjective   This is a 28 year old male with MDD, recurrent/severe/w/o psychosis, PTSD, ETOH use disorder, moderate, dependence w/o withdrawal, and TBI. Pt's mood today was significantly improved vs yesterday's visit. He reports \"feeling better- no light headedness\" . He is taking the buspar and believes \"there may be some improvement\". I offered to increase this which he would like to do. He admits he feels anxious and wants to get to treatment asap to \"move on with my life\". Reports mood is \"tolerable\". Sleep is \"not all that hot\". Does not want any changes in his medication.  10 point ROS negative other than what is noted in HPI.       DIagnoses:   MDD, Recurrent/Severe w/o psychosis  PTSD  Alcohol Use Disorder/Moderate/Dependence w/o withdrawal  TBI, unspecified    Attestation:  Patient has been seen and evaluated by me,  YARED Lawton CNP          Interim History:   The patient's care was discussed with the treatment team and chart notes were reviewed.          Medications:       busPIRone  5 mg Oral TID     nicotine  1 patch Transdermal Daily     nicotine   Transdermal Q8H     nicotine   Transdermal Daily     prazosin  1 mg Oral At Bedtime     acetaminophen, alum & mag hydroxide-simethicone, bisacodyl, hydrOXYzine, magnesium hydroxide, nicotine polacrilex, OLANZapine **OR** OLANZapine, traZODone          Allergies:   No Known Allergies         Psychiatric Examination:   BP 98/55  Pulse 80  Temp 97.4  F (36.3  C) (Tympanic)  Resp 16  Ht 1.727 m (5' 8\")  Wt 72.1 kg (158 lb 14.4 oz)  SpO2 95%  BMI 24.16 kg/m2  Weight is 158 lbs 14.4 oz  Body mass index is 24.16 kg/(m^2).    Appearance:  awake, alert and dressed in hospital scrubs  Attitude:  cooperative  Eye Contact:  fair  Mood:  better and still anxious  Affect:  mood congruent  Speech:  clear, coherent  Psychomotor Behavior:  no evidence of tardive dyskinesia, dystonia, or tics and intact " station, gait and muscle tone  Thought Process:  goal oriented  Associations:  no loose associations  Thought Content:  no evidence of suicidal ideation or homicidal ideation  Insight:  fair  Judgment:  fair  Oriented to:  time, person, and place  Attention Span and Concentration:  intact  Recent and Remote Memory:  intact  Fund of Knowledge: appropriate  Muscle Strength and Tone: normal  Gait and Station: Normal  Perception: no perceptual disorder noted.         Labs:   No results found for this or any previous visit (from the past 24 hour(s)).          Assessment/ Plan:    Increase buspar from 5 mg TID to 7.5 mg TID         For all new medications pt was instructed on the drug, dose, route, action and potential side effects. Pt verbalized understanding.

## 2018-11-25 NOTE — PLAN OF CARE
Problem: Patient Care Overview  Goal: Individualization & Mutuality  Patient will be cooperative with assessments  Patient will eat at least 75% meals  Patient will sleep 6-8 hours     Outcome: Improving  Pt eating, attended groups this AM and set a goal. Buspar increased to 7.5 mg TID. Pt has been cooperative, flat affect but has been calm. When asked if pt had a good visit with mom pt shook his head yes. Pt was seen giving mom a big hug before she left. Pt states depression low at 2/10 and Anxiety more like 5/10 with the high energy Milieu this AM.     Problem: Depressive Symptoms  Goal: Depressive Symptoms  Signs and symptoms of listed problems will be absent or manageable.  Patient's depression will be improved by discharge  Patient will remain calm and in control    Patient will verbalize if anxiety and or anger is presenting a problem.   Outcome: Improving  Pt having a good shift this AM. Pt states depression is 2/10 much improved and is hopeful his meds seem to be helping. Pt states anxiety as high but no anger issues this shift. Visit with mom went well.

## 2018-11-25 NOTE — PLAN OF CARE
Face to face end of shift report received from pm RN. Rounding completed. Patient observed.     Hussain Vásquez  11/25/2018  12:03 AM

## 2018-11-26 PROCEDURE — 25000132 ZZH RX MED GY IP 250 OP 250 PS 637: Performed by: NURSE PRACTITIONER

## 2018-11-26 PROCEDURE — 12400011

## 2018-11-26 RX ADMIN — HYDROXYZINE HYDROCHLORIDE 100 MG: 25 TABLET, FILM COATED ORAL at 12:58

## 2018-11-26 RX ADMIN — Medication 7.5 MG: at 20:44

## 2018-11-26 RX ADMIN — ACETAMINOPHEN 650 MG: 325 TABLET, FILM COATED ORAL at 20:45

## 2018-11-26 RX ADMIN — Medication 7.5 MG: at 13:12

## 2018-11-26 RX ADMIN — Medication 7.5 MG: at 08:12

## 2018-11-26 RX ADMIN — OLANZAPINE 10 MG: 10 TABLET ORAL at 11:34

## 2018-11-26 RX ADMIN — PHENYTOIN 1 MG: 125 SUSPENSION ORAL at 20:44

## 2018-11-26 RX ADMIN — OLANZAPINE 10 MG: 10 TABLET ORAL at 20:46

## 2018-11-26 RX ADMIN — NICOTINE 1 PATCH: 21 PATCH, EXTENDED RELEASE TRANSDERMAL at 08:13

## 2018-11-26 RX ADMIN — TRAZODONE HYDROCHLORIDE 50 MG: 50 TABLET ORAL at 20:44

## 2018-11-26 ASSESSMENT — ACTIVITIES OF DAILY LIVING (ADL)
GROOMING: INDEPENDENT
ORAL_HYGIENE: INDEPENDENT
DRESS: SCRUBS (BEHAVIORAL HEALTH);INDEPENDENT
LAUNDRY: UNABLE TO COMPLETE

## 2018-11-26 NOTE — PLAN OF CARE
Problem: Patient Care Overview  Goal: Team Discussion  Team Plan:   BEHAVIORAL TEAM DISCUSSION    Participants: Naina Martinez NP, Yara Kaiser NP, Carlos Page NP, Veronica Oconnor LSW, Zenaida Willams LSW, Smitha Pisano RN, Laura Portillo RN, Elba Harris RN, Lor Kol[facility], Reyna Hankins OT, Elizabeth Tucker OT  Progress: fair, intense, irritable, attending groups, social on the unit  Continued Stay Criteria/Rationale: recent increase in Buspar, continue to stabilize  Medical/Physical: history of TBI, Crohn's Disease  Precautions:   Behavioral Orders   Procedures     Code 1 - Restrict to Unit     Routine Programming     As clinically indicated     Self Injury Precaution     Status 15     Every 15 minutes.     Plan: referral to [facility] and on the [facility] list  Rationale for change in precautions or plan: None    Patient Active Problem List   Diagnosis     Psychosis (H)       Current Facility-Administered Medications:      acetaminophen (TYLENOL) tablet 650 mg, 650 mg, Oral, Q4H PRN, Mahsa Iraheta, NP, 650 mg at 11/25/18 2202     alum & mag hydroxide-simethicone (MYLANTA ES/MAALOX  ES) suspension 30 mL, 30 mL, Oral, Q4H PRN, Mahsa Iraheta, NP     bisacodyl (DULCOLAX) Suppository 10 mg, 10 mg, Rectal, Daily PRN, Mahsa Iraheta NP     busPIRone (BUSPAR) half-tab 7.5 mg, 7.5 mg, Oral, TID, Yeni Jean APRN CNP, 7.5 mg at 11/26/18 0812     hydrOXYzine (ATARAX) tablet  mg,  mg, Oral, Q4H PRN, Carlos Page NP, 100 mg at 11/23/18 1307     magnesium hydroxide (MILK OF MAGNESIA) suspension 30 mL, 30 mL, Oral, At Bedtime PRN, Mahsa Iraheta NP     nicotine (NICODERM CQ) 21 MG/24HR 24 hr patch 1 patch, 1 patch, Transdermal, Daily, Carlos Page NP, 1 patch at 11/26/18 0813     nicotine Patch in Place, , Transdermal, Q8H, Carlos Page NP     nicotine patch REMOVAL, , Transdermal, Daily, Carlos Page, NP, Stopped at 11/24/18 0855     nicotine polacrilex lozenge 4 mg, 4  mg, Buccal, Q2H PRN, Carlos Page NP     OLANZapine (zyPREXA) tablet 10 mg, 10 mg, Oral, Q2H PRN, 10 mg at 11/24/18 1555 **OR** OLANZapine (zyPREXA) injection 10 mg, 10 mg, Intramuscular, Q2H PRN, Mahsa Iraheta, NP     prazosin (MINIPRESS) capsule 1 mg, 1 mg, Oral, At Bedtime, Carlos Page NP, 1 mg at 11/25/18 2047     traZODone (DESYREL) tablet 50 mg, 50 mg, Oral, At Bedtime PRN, Mahsa Iraheta NP, 50 mg at 11/25/18 2202

## 2018-11-26 NOTE — PLAN OF CARE
Face to face end of shift report received from Hussain MEADE RN. Rounding completed. Patient observed in [facility].     Mellissa Mills  11/26/2018  7:58 AM

## 2018-11-26 NOTE — PLAN OF CARE
"Problem: Patient Care Overview  Goal: Individualization & Mutuality  Patient will be cooperative with assessments  Patient will eat at least 75% meals  Patient will sleep 6-8 hours     Outcome: No Change  Patient is pleasant and cooperative with nursing assessment. Reports mild anxiety but declines PRN stating it is manageable. Reports depression 2/10. Denies SI, HI, pain, and hallucinations. Agrees to contact staff if having thoughts of self harm. Patient reports not \"feeling anything yet\" regarding Buspar. Encouraged to give medications time which he agrees to do so. Social with peers and cooperative with medications without issue. Attending groups. Tells his writer he is hopeful to get into [facility] for treatment soon. Eating well. Agrees to make needs known.   Requested and received PRN Zyprexa 10 mg at 1134 for agitation. Patient tearful and tense reporting that he was just fired from his job due to his hospitalization. Does not wish to discuss this at this time.   Reports no relief from Zyprexa. Recieved PRN Atarax 100 mg at 1258 for increased anxiety.     Problem: Depressive Symptoms  Goal: Depressive Symptoms  Signs and symptoms of listed problems will be absent or manageable.  Patient's depression will be improved by discharge  Patient will remain calm and in control    Patient will verbalize if anxiety and or anger is presenting a problem.   Outcome: No Change  Reports depression 2/10.   In control of behaviors this shift.   Reports anxiety later in the shift. Received and received PRN's.      "

## 2018-11-26 NOTE — DISCHARGE INSTRUCTIONS
Behavioral Discharge Planning and Instructions    Summary: Carlo was admitted to  due to a revocation of his stay of commitment     Main Diagnosis: Major Depressive Disorder, Recurrent, Severe without psychosis, PTSD, Alcohol use disorder, moderate, dependence w/o withdrawal, Traumatic Brain Injury, unspecified    Major Treatments, Procedures and Findings: Stabilize with medications, connect with community programs.    Symptoms to Report: feeling more aggressive, increased confusion, losing more sleep, mood getting worse or thoughts of suicide    Lifestyle Adjustment: Take all medications as prescribed, meet with doctor/ medication provider, out patient therapist, , and [facility] worker as scheduled. Abstain from alcohol or any unprescribed drugs.    Psychiatry Follow-up:     [facility]   - Pamela Rae - [phone]  CD  - Maggie Aldana - [phone]   Pool BROWNING (supervisor) - [phone]   Fax: [phone]    [facility]  Contact - [address] -  [address]  [place]  [address]  Phone - [phone]  Fax - [phone]     Resources:   Crisis Intervention: 380.992.4035 or 247-260-5203 (TTY: 179.519.3183).  Call anytime for help.  National Harvey on Mental Illness (www.[place].sayda.org): 595.967.3279 or 613-234-8742.  Alcoholics Anonymous (www.alcoholics-anonymous.org): Check your phone book for your local chapter.  Suicide Awareness Voices of Education (SAVE) (www.save.org): 750-932-KDLR (8928)  National Suicide Prevention Line (www.mentalhealthmn.org): 859-916-YGIS (6843)  Mental Health Consumer/Survivor Network of [place] (www.mhcsn.net): 226.949.6566 or 205-326-0590  Mental Health Association of [place] (www.mentalhealth.org): 100.890.2561 or 454-159-8395    General Medication Instructions:   See your medication sheet(s) for instructions.   Take all medicines as directed.  Make no changes unless your doctor suggests them.   Go to all your  "doctor visits.  Be sure to have all your required lab tests. This way, your medicines can be refilled on time.  Do not use any drugs not prescribed by your doctor.  Avoid alcohol.    [place] Area:  [facility], Crisis stabilization [facility]- [phone]  [facility] Crisis Line: 1-800.103.9491  Advocates For Family Peace: 052-5133  Sexual Assault Program of [facility]: 476.667.7725 or 1-414.824.5142  [place] Battered Women's Program: 1-701.614.4111 Ext: 279       Calls answered Mon-Fri-8:00 am--4:30 pm    [place]:  Adv[facility]ates for Family Peace: 1-474.130.3858  [facility] first call for help: 1-689.690.2336  [facility] Crisis Center:  (824) 493-7760      [place] Area:  Warm Line: 1-934.962.1028       Calls answered Tuesday--Saturday 4:00 pm--10:00 pm  [facility] Crisis Line - 285.902.1911  [place] [facility] 744-854-1925    [place] Statewide:  [place] Crisis and Referral Services: 1-423.598.6323  National Suicide Prevention Lifeline: 9-989-371-TALK (9691)   - zri5udje- Text \"Life\" to 84883  First Call for Help: 2-1-1  [facility] Helpline- 4-430-FZHH-HELP      "

## 2018-11-26 NOTE — PLAN OF CARE
"Problem: Patient Care Overview  Goal: Discharge Needs Assessment  Outcome: No Change  Received a call from pt  this morning - she stated she is hoping to get pt Rule 25 today and forward it to [facility] she stated they have assigned an , Maggie, to pt case. Pt reported this morning that he spoke with Adalid on Friday and requested they send his updated Rule 25 to both the unit and [facility] - left a message with Bridget.     Spoke with Maggie Aldana -  assigned to pt case in [facility] she stated she just received the Rule 25 and will be getting in contact with [facility]. Pt mom called for an update - gave her an update - she stated that pt called her earlier and told her he lost his job \"for being in the hospital\" - mom stated that pt is very short tempered and is very angry and upset with his girlfriend for giving [facility] the copies of some of pt text messages to his girlfriend - mom stated pt blames her for his current situation and has not spoken with her recently. Mom also stated that pt girlfriend has recorded some of the arguments they have had as pt will deny his anger.         "

## 2018-11-26 NOTE — PLAN OF CARE
Problem: Patient Care Overview  Goal: Individualization & Mutuality  Patient will be cooperative with assessments  Patient will eat at least 75% meals  Patient will sleep 6-8 hours     Outcome: Improving  Pt has been up in the dayroom watching TV and reading the books and magazines his mother brought. He told me that he is feeling a bit better today but is still pretty depressed. He hopes that his Rule 25 results can be sent to [facility] in the morning so that he can get on their waiting list. He told me that he has not noticed a significant benefit from the Buspar yet. He shared that he is struggling some due to being so inactive here. 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 He hopes that he will be able to go outside and walk when in treatment.  Pt states that he continues to feel powerless and depressed. He denied having any suicidal thoughts.    Problem: Depressive Symptoms  Goal: Depressive Symptoms  Signs and symptoms of listed problems will be absent or manageable.  Patient's depression will be improved by discharge  Patient will remain calm and in control    Patient will verbalize if anxiety and or anger is presenting a problem.   Outcome: Improving  Pt reports his depression is severe, he has been calm and in control of his anger.  Goal: Social and Therapeutic (Depression)  Signs and symptoms of listed problems will be absent or manageable.   Outcome: Improving  Pt reports being very depressed. He has been out in the dayroom all evening.

## 2018-11-27 PROCEDURE — 25000132 ZZH RX MED GY IP 250 OP 250 PS 637: Performed by: NURSE PRACTITIONER

## 2018-11-27 PROCEDURE — 12400011

## 2018-11-27 PROCEDURE — 99232 SBSQ HOSP IP/OBS MODERATE 35: CPT | Performed by: NURSE PRACTITIONER

## 2018-11-27 RX ORDER — TRAZODONE HYDROCHLORIDE 100 MG/1
100 TABLET ORAL
Status: DISCONTINUED | OUTPATIENT
Start: 2018-11-27 | End: 2018-12-03

## 2018-11-27 RX ADMIN — Medication 7.5 MG: at 09:06

## 2018-11-27 RX ADMIN — TRAZODONE HYDROCHLORIDE 100 MG: 100 TABLET ORAL at 19:59

## 2018-11-27 RX ADMIN — OLANZAPINE 10 MG: 10 TABLET ORAL at 19:59

## 2018-11-27 RX ADMIN — PHENYTOIN 1 MG: 125 SUSPENSION ORAL at 20:00

## 2018-11-27 RX ADMIN — Medication 7.5 MG: at 20:00

## 2018-11-27 RX ADMIN — NICOTINE 1 PATCH: 21 PATCH, EXTENDED RELEASE TRANSDERMAL at 09:06

## 2018-11-27 RX ADMIN — TRAZODONE HYDROCHLORIDE 100 MG: 100 TABLET ORAL at 21:39

## 2018-11-27 RX ADMIN — Medication 7.5 MG: at 13:51

## 2018-11-27 ASSESSMENT — ACTIVITIES OF DAILY LIVING (ADL)
ORAL_HYGIENE: INDEPENDENT
GROOMING: INDEPENDENT
DRESS: SCRUBS (BEHAVIORAL HEALTH)
LAUNDRY: UNABLE TO COMPLETE

## 2018-11-27 NOTE — PLAN OF CARE
"Problem: Patient Care Overview  Goal: Discharge Needs Assessment  Outcome: No Change  Spoke with Maggie she stated that she did send the Rule 25 to [facility] late yesterday afternoon \"around 4:00\", but stated she has not heard anything back at this point - Maggie reported she also sent over pt court papers to [facility] as well. Maggie states she has explained to pt that the process may take a while to get pt into treatment and states she knows pt is getting agitated and irritable due to \"his short impulse control\"   "

## 2018-11-27 NOTE — PLAN OF CARE
"Problem: Patient Care Overview  Goal: Individualization & Mutuality  Patient will be cooperative with assessments  Patient will eat at least 75% meals  Patient will sleep 6-8 hours     Outcome: No Change  Denies pain, SI, HI, hallucinations. Withdrawn, frustrated/ becomes easily irritated with assessment. States he is having anxiety caused from being admitted here. States he \"just wanted to go to treatment. This place is not helping me. I could have gotten into treatment faster from home.\" pt becomes visible irritated while discussing tx. Also states his girlfriend needs him for support due to miscarriage. Discusses loss of job and insurance. Offer pt prn medication for anxiety-declined. States \" I just need to get out of here now, that's all you can do to help me.\"     Problem: Depressive Symptoms  Goal: Social and Therapeutic (Depression)  Signs and symptoms of listed problems will be absent or manageable.   Outcome: Improving  Denies depression. t/o shift.       "

## 2018-11-27 NOTE — PROGRESS NOTES
"[facility]  Psychiatric Progress Note    Subjective   This is a 28 year old male with MDD, recurrent/severe/w/o psychosis, PTSD, ETOH use disorder, moderate, dependence w/o withdrawal, and TBI.     Reported that he is feeling \"powerless\" today and just wants to go home. He did not want any changes in medication. Discussed use of Zyprexa, which he has taken the last two nights, but he refused to consider taking scheduled. He did request an increase in Trazodone at bed to help with sleep.          DIagnoses:   MDD, Recurrent/Severe w/o psychosis  PTSD  Alcohol Use Disorder/Moderate/Dependence w/o withdrawal  TBI, unspecified    Attestation:  Patient has been seen and evaluated by me,  Carlos Page NP          Interim History:   The patient's care was discussed with the treatment team and chart notes were reviewed.          Medications:       busPIRone  7.5 mg Oral TID     nicotine  1 patch Transdermal Daily     nicotine   Transdermal Q8H     nicotine   Transdermal Daily     prazosin  1 mg Oral At Bedtime     acetaminophen, alum & mag hydroxide-simethicone, bisacodyl, hydrOXYzine, magnesium hydroxide, nicotine, OLANZapine **OR** OLANZapine, traZODone          Allergies:   No Known Allergies         Psychiatric Examination:   /55  Pulse 73  Temp 97.9  F (36.6  C) (Tympanic)  Resp 16  Ht 1.727 m (5' 8\")  Wt 71.1 kg (156 lb 11.2 oz)  SpO2 97%  BMI 23.83 kg/m2  Weight is 156 lbs 11.2 oz  Body mass index is 23.83 kg/(m^2).    Appearance: awake, alert, casually groomed  Attitude: Cooperative  Eye Contact: normal  Mood: depressed  Affect:  mood congruent  Speech:  clear, coherent  Psychomotor Behavior:  no evidence of tardive dyskinesia, dystonia, or tics and intact station, gait and muscle tone  Thought Process:  Logical, linear, goal oriented  Associations:  no loose associations  Thought Content:  no evidence of suicidal ideation or homicidal ideation  Insight:  fair  Judgment:  " fair  Oriented to:  time, person, and place  Attention Span and Concentration:  intact  Recent and Remote Memory:  intact  Fund of Knowledge: appropriate  Muscle Strength and Tone: normal  Gait and Station: Normal   Perception: no perceptual disorder noted.         Labs:   No results found for this or any previous visit (from the past 24 hour(s)).          Assessment/ Plan:   Increase Trazodone to 100mg at bed as needed.  Referral to Caitlin

## 2018-11-27 NOTE — PLAN OF CARE
Problem: Patient Care Overview  Goal: Individualization & Mutuality  Patient will be cooperative with assessments  Patient will eat at least 75% meals  Patient will sleep 6-8 hours     Outcome: Declining  Patient reported both anxiety and depression. He said he felt he has done more to get himself into treatment than staff here has done. He also said he received news today that he has been fired from his job. He reported his boss said that since he reported he needed to get into treatment, that was the same as quitting his job. Patient agreed to search out more resources tomorrow and is requesting to talk to  about being able to file for unemployment. Will pass this along as patient is concerned he is losing his medical insurance. Patient requested PRNs at 20:46. He asked for tylenol for pain in his back rated 2 of 10 and said he normally wakes up in the middle of the night with a back ache but he tried this last night and it help. Patient also requested 10mg Zyprexa for anxiety rated 10 of 10 and received. Patient is cooperative and pleasant but he was obviously under distress.

## 2018-11-27 NOTE — PLAN OF CARE
Problem: Patient Care Overview  Goal: Individualization & Mutuality  Patient will be cooperative with assessments  Patient will eat at least 75% meals  Patient will sleep 6-8 hours     Outcome: Improving  Slept all noc without issue.

## 2018-11-27 NOTE — PLAN OF CARE
Face to face end of shift report received from STEPH Patel. Rounding completed. Patient observed. Lying on left side - eyes closed - non-labored breathing noted.     Marla Santillan  11/27/2018  2:30 AM

## 2018-11-27 NOTE — PLAN OF CARE
Face to face end of shift report received from Mellissa LIRIANO RN. Rounding completed. Patient observed lying in bed with eyes closed.     Amanda Britt  11/26/2018  9:37 PM

## 2018-11-27 NOTE — PLAN OF CARE
Face to face end of shift report received from STEPH Gutiérrez. Rounding completed. Patient observed.     Clare Gloria  11/27/2018  8:25 AM

## 2018-11-27 NOTE — PLAN OF CARE
Problem: Patient Care Overview  Goal: Team Discussion  Team Plan:   BEHAVIORAL TEAM DISCUSSION    Participants:  Naina Martinez NP, Yara Kaiser NP, Carlos Page NP, Veronica Oconnor LSW,  Zenaida Willams LSW, Elba Harris RN, Lor Kolb [employer], Elizabeth Tucker OT, Elizabeth Wilson OT  Progress: minimal - irritable and agitated   Continued Stay Criteria/Rationale: continue with current medications - NP to assess today for changes   Medical/Physical: hx of TBI  Precautions:   Behavioral Orders   Procedures     Code 1 - Restrict to Unit     Routine Programming     As clinically indicated     Self Injury Precaution     Status 15     Every 15 minutes.     Plan: revoked - on the list for Teofilo   Rationale for change in precautions or plan: none     Patient Active Problem List   Diagnosis     Psychosis (H)       Current Facility-Administered Medications:      acetaminophen (TYLENOL) tablet 650 mg, 650 mg, Oral, Q4H PRN, Mahsa Iraheta NP, 650 mg at 11/26/18 2045     alum & mag hydroxide-simethicone (MYLANTA ES/MAALOX  ES) suspension 30 mL, 30 mL, Oral, Q4H PRN, Mahsa Iraheta, NP     bisacodyl (DULCOLAX) Suppository 10 mg, 10 mg, Rectal, Daily PRN, Mahsa Iraheta NP     busPIRone (BUSPAR) half-tab 7.5 mg, 7.5 mg, Oral, TID, Yeni Jean, APRN CNP, 7.5 mg at 11/27/18 0906     hydrOXYzine (ATARAX) tablet  mg,  mg, Oral, Q4H PRN, Carlos Page NP, 100 mg at 11/26/18 1258     magnesium hydroxide (MILK OF MAGNESIA) suspension 30 mL, 30 mL, Oral, At Bedtime PRN, Mahsa Iraheta, NP     nicotine (NICODERM CQ) 21 MG/24HR 24 hr patch 1 patch, 1 patch, Transdermal, Daily, Carlos Page NP, 1 patch at 11/27/18 0906     nicotine Patch in Place, , Transdermal, Q8H, Carlos Page NP     nicotine patch REMOVAL, , Transdermal, Daily, Carlos Page NP, Stopped at 11/24/18 0833     nicotine polacrilex lozenge 4 mg, 4 mg, Buccal, Q2H PRN, Carlos Page, NP     OLANZapine (zyPREXA)  tablet 10 mg, 10 mg, Oral, Q2H PRN, 10 mg at 11/26/18 2046 **OR** OLANZapine (zyPREXA) injection 10 mg, 10 mg, Intramuscular, Q2H PRN, Mahsa Iraheta NP     prazosin (MINIPRESS) capsule 1 mg, 1 mg, Oral, At Bedtime, Carlos Page NP, 1 mg at 11/26/18 2044     traZODone (DESYREL) tablet 50 mg, 50 mg, Oral, At Bedtime PRN, Mahsa Iraheta NP, 50 mg at 11/26/18 2044

## 2018-11-28 PROCEDURE — 12400011

## 2018-11-28 PROCEDURE — 99232 SBSQ HOSP IP/OBS MODERATE 35: CPT | Performed by: NURSE PRACTITIONER

## 2018-11-28 PROCEDURE — 25000132 ZZH RX MED GY IP 250 OP 250 PS 637: Performed by: NURSE PRACTITIONER

## 2018-11-28 RX ORDER — BUSPIRONE HYDROCHLORIDE 10 MG/1
10 TABLET ORAL 2 TIMES DAILY
Status: DISCONTINUED | OUTPATIENT
Start: 2018-11-28 | End: 2018-12-03

## 2018-11-28 RX ADMIN — PHENYTOIN 1 MG: 125 SUSPENSION ORAL at 20:15

## 2018-11-28 RX ADMIN — TRAZODONE HYDROCHLORIDE 100 MG: 100 TABLET ORAL at 20:15

## 2018-11-28 RX ADMIN — OLANZAPINE 10 MG: 10 TABLET ORAL at 20:15

## 2018-11-28 RX ADMIN — Medication 7.5 MG: at 08:08

## 2018-11-28 RX ADMIN — NICOTINE 1 PATCH: 21 PATCH, EXTENDED RELEASE TRANSDERMAL at 08:08

## 2018-11-28 RX ADMIN — OLANZAPINE 10 MG: 10 TABLET ORAL at 08:58

## 2018-11-28 RX ADMIN — BUSPIRONE HYDROCHLORIDE 10 MG: 10 TABLET ORAL at 20:15

## 2018-11-28 ASSESSMENT — ACTIVITIES OF DAILY LIVING (ADL)
GROOMING: INDEPENDENT
ORAL_HYGIENE: INDEPENDENT
DRESS: SCRUBS (BEHAVIORAL HEALTH)
GROOMING: INDEPENDENT
LAUNDRY: UNABLE TO COMPLETE

## 2018-11-28 NOTE — PLAN OF CARE
Face to face end of shift report received from Zari AGUILAR RN. Rounding completed. Patient observed in bed awake.     Janki De Leon  11/28/2018  4:42 PM

## 2018-11-28 NOTE — PLAN OF CARE
Face to face end of shift report received from Clare TILLMAN RN. Rounding completed. Patient observed in the lounge.     Amanda Britt  11/27/2018  6:31 PM

## 2018-11-28 NOTE — PLAN OF CARE
Face to face end of shift report received from STEPH Matos. Rounding completed. Patient observed. Lying in supine position - eyes closed - non-labored breathing noted.     Marla Santillan  11/28/2018  1:47 AM

## 2018-11-28 NOTE — PLAN OF CARE
Problem: Patient Care Overview  Goal: Team Discussion  Team Plan:   BEHAVIORAL TEAM DISCUSSION    Participants: Mahsa Iraheta NP, Carlos Page NP, Lisa Daniels NP, Veronica Oconnor LSW, Zenaida Willams LSW, Smitha Pisano RN, Laura Harris RN, Reyna Hankins OT  Progress: fair  Continued Stay Criteria/Rationale: increase Buspar  Medical/Physical: TBI  Precautions:   Behavioral Orders   Procedures     Code 1 - Restrict to Unit     Routine Programming     As clinically indicated     Self Injury Precaution     Status 15     Every 15 minutes.     Plan: revoked, on list yamile Red  Rationale for change in precautions or plan: none    Patient Active Problem List   Diagnosis     Psychosis (H)       Current Facility-Administered Medications:      acetaminophen (TYLENOL) tablet 650 mg, 650 mg, Oral, Q4H PRN, Mahsa Iraheta NP, 650 mg at 11/26/18 2045     alum & mag hydroxide-simethicone (MYLANTA ES/MAALOX  ES) suspension 30 mL, 30 mL, Oral, Q4H PRN, Mahsa Iraheta NP     bisacodyl (DULCOLAX) Suppository 10 mg, 10 mg, Rectal, Daily PRN, Mahsa Iraheta NP     busPIRone (BUSPAR) half-tab 7.5 mg, 7.5 mg, Oral, TID, Yeni Jean APRN CNP, 7.5 mg at 11/28/18 0808     hydrOXYzine (ATARAX) tablet  mg,  mg, Oral, Q4H PRN, Carlos Page NP, 100 mg at 11/26/18 1258     magnesium hydroxide (MILK OF MAGNESIA) suspension 30 mL, 30 mL, Oral, At Bedtime PRN, Mahsa Iraheta NP     nicotine (NICODERM CQ) 21 MG/24HR 24 hr patch 1 patch, 1 patch, Transdermal, Daily, Carlos Page NP, 1 patch at 11/28/18 0808     nicotine Patch in Place, , Transdermal, Q8H, Carlos Page NP, Stopped at 11/28/18 0241     nicotine patch REMOVAL, , Transdermal, Daily, Carlos Page NP, Stopped at 11/24/18 0833     nicotine polacrilex lozenge 4 mg, 4 mg, Buccal, Q2H PRN, Carlos Page, NP     OLANZapine (zyPREXA) tablet 10 mg, 10 mg, Oral, Q2H PRN, 10 mg at 11/28/18 0858 **OR** OLANZapine (zyPREXA)  injection 10 mg, 10 mg, Intramuscular, Q2H PRN, Mahsa Iraheta NP     prazosin (MINIPRESS) capsule 1 mg, 1 mg, Oral, At Bedtime, Carlos Page NP, 1 mg at 11/27/18 2000     traZODone (DESYREL) tablet 100 mg, 100 mg, Oral, At Bedtime PRN, Carlos Page NP, 100 mg at 11/27/18 2139

## 2018-11-28 NOTE — PLAN OF CARE
"Problem: Patient Care Overview  Goal: Discharge Needs Assessment  Outcome: No Change  Spoke with Bridget at [facility] she stated they did receive the Rule 25, but due to only having one counselor this week they are behind and have not been able to review the complete referral as of today. She states she will try to get someone to review it, but states \"no guarantees\".       "

## 2018-11-28 NOTE — PLAN OF CARE
Face to face end of shift report received from Marla RN. Rounding completed. Patient observed.     Zari Rivera  11/28/2018  7:26 AM

## 2018-11-28 NOTE — PLAN OF CARE
Problem: Patient Care Overview  Goal: Individualization & Mutuality  Patient will be cooperative with assessments  Patient will eat at least 75% meals  Patient will sleep 6-8 hours     Outcome: No Change  Stated frustration and hopelessness regarding present situation. Doesn't feel anything is being done or finalized to help him. Upset he was fired from his good paying job and doesn't know what to do now. Wants to get his life organized and feels helpless just being here. Cooperative with writer. No requests for medications at this time    1345- pt notified of Buspar medication change. Remains quiet and withdrawn.

## 2018-11-28 NOTE — PROGRESS NOTES
"[facility]  Psychiatric Progress Note    Subjective   This is a 28 year old male with MDD, recurrent/severe/w/o psychosis, PTSD, ETOH use disorder, moderate, dependence w/o withdrawal, and TBI.     Mood unchanged. Would like Buspar split into two doses versus 3. Agreed to increase to 10mg from 7.5mg and schedule morning and evening. Remains frustrated and wanting to leave. Does not understand the delay. Very reactive and becomes notably more irritable the longer we talk.          DIagnoses:   MDD, Recurrent/Severe w/o psychosis  PTSD  Alcohol Use Disorder/Moderate/Dependence w/o withdrawal  TBI, unspecified    Attestation:  Patient has been seen and evaluated by me,  Carlos Page NP          Interim History:   The patient's care was discussed with the treatment team and chart notes were reviewed.          Medications:       busPIRone  10 mg Oral BID     nicotine  1 patch Transdermal Daily     nicotine   Transdermal Q8H     nicotine   Transdermal Daily     prazosin  1 mg Oral At Bedtime     acetaminophen, alum & mag hydroxide-simethicone, bisacodyl, hydrOXYzine, magnesium hydroxide, nicotine, OLANZapine **OR** OLANZapine, traZODone          Allergies:   No Known Allergies         Psychiatric Examination:   BP (!) 107/49  Pulse 81  Temp 97.6  F (36.4  C) (Tympanic)  Resp 12  Ht 1.727 m (5' 8\")  Wt 71.1 kg (156 lb 11.2 oz)  SpO2 97%  BMI 23.83 kg/m2  Weight is 156 lbs 11.2 oz  Body mass index is 23.83 kg/(m^2).    Appearance:awake, alert  Attitude: Cooperative  Eye Contact: normal  Mood: depressed, irritable  Affect:  mood congruent  Speech:  clear, coherent  Psychomotor Behavior:  no evidence of tardive dyskinesia, dystonia, or tics and intact station, gait and muscle tone  Thought Process:  Logical, linear, goal oriented  Associations:  no loose associations  Thought Content:  no evidence of suicidal ideation or homicidal ideation  Insight:  fair  Judgment:  fair  Oriented to:  time, person, " and place  Attention Span and Concentration:  intact  Recent and Remote Memory:  intact  Fund of Knowledge: appropriate  Muscle Strength and Tone: normal  Gait and Station: Normal   Perception: no perceptual disorder noted.         Labs:   No results found for this or any previous visit (from the past 24 hour(s)).          Assessment/ Plan:   Change Buspar to 10mg twice daily.  Referral to Caitlin

## 2018-11-28 NOTE — PLAN OF CARE
Problem: Patient Care Overview  Goal: Individualization & Mutuality  Patient will be cooperative with assessments  Patient will eat at least 75% meals  Patient will sleep 6-8 hours     Outcome: No Change  Patient endorsed both depression and anxiety. He stated he is mostly frustrated because nothing is moving fast enough and he feels he is wasting his time here. Patient denied pain. He was isolative and withdrawn. He did not attend groups. Patient did sit out in the lounge but does not interact with peers. He is quick to react to stressors.    20:45 Update: Patient requested 10mg Zyprexa and 100mg trazodone at 19:59. He reported high anxiety over many stressors in his life. He also said he has not been able to sleep well. At 21:39 Patient asked if he could have a repeat dose of the trazodone 100mg. He was noticeably more calm than earlier but he had been watching TV for the past 1.5 hours and still couldn't fall asleep. Patient was given second dose. Patient is also requesting to have his Buspar increased to a higher dose but would only like to take it twice per day. Will pass on in report.     Problem: Depressive Symptoms  Goal: Depressive Symptoms  Signs and symptoms of listed problems will be absent or manageable.  Patient's depression will be improved by discharge  Patient will remain calm and in control    Patient will verbalize if anxiety and or anger is presenting a problem.   Outcome: No Change  Patient seemed short tempered. He is tense and he'd push his chair in hard enough or it to make noise.

## 2018-11-29 PROCEDURE — 12400011

## 2018-11-29 PROCEDURE — 25000132 ZZH RX MED GY IP 250 OP 250 PS 637: Performed by: NURSE PRACTITIONER

## 2018-11-29 PROCEDURE — 99232 SBSQ HOSP IP/OBS MODERATE 35: CPT | Performed by: NURSE PRACTITIONER

## 2018-11-29 RX ORDER — QUETIAPINE FUMARATE 25 MG/1
25-50 TABLET, FILM COATED ORAL 3 TIMES DAILY PRN
Status: DISCONTINUED | OUTPATIENT
Start: 2018-11-29 | End: 2018-12-05 | Stop reason: HOSPADM

## 2018-11-29 RX ORDER — CLONIDINE HYDROCHLORIDE 0.1 MG/1
0.1 TABLET ORAL 2 TIMES DAILY PRN
Status: DISCONTINUED | OUTPATIENT
Start: 2018-11-29 | End: 2018-12-05 | Stop reason: HOSPADM

## 2018-11-29 RX ADMIN — BUSPIRONE HYDROCHLORIDE 10 MG: 10 TABLET ORAL at 08:14

## 2018-11-29 RX ADMIN — OLANZAPINE 10 MG: 10 TABLET ORAL at 11:08

## 2018-11-29 RX ADMIN — BUSPIRONE HYDROCHLORIDE 10 MG: 10 TABLET ORAL at 20:00

## 2018-11-29 RX ADMIN — NICOTINE 1 PATCH: 21 PATCH, EXTENDED RELEASE TRANSDERMAL at 08:13

## 2018-11-29 RX ADMIN — QUETIAPINE FUMARATE 50 MG: 25 TABLET ORAL at 20:00

## 2018-11-29 RX ADMIN — TRAZODONE HYDROCHLORIDE 100 MG: 100 TABLET ORAL at 20:00

## 2018-11-29 RX ADMIN — PHENYTOIN 1 MG: 125 SUSPENSION ORAL at 20:00

## 2018-11-29 ASSESSMENT — ACTIVITIES OF DAILY LIVING (ADL)
LAUNDRY: UNABLE TO COMPLETE
ORAL_HYGIENE: INDEPENDENT
DRESS: SCRUBS (BEHAVIORAL HEALTH);INDEPENDENT
GROOMING: INDEPENDENT
GROOMING: INDEPENDENT
DRESS: SCRUBS (BEHAVIORAL HEALTH)
ORAL_HYGIENE: INDEPENDENT

## 2018-11-29 NOTE — PLAN OF CARE
Face to face end of shift report received from STEPH Shearer. Rounding completed. Patient observed. Lying on right side - eyes closed - non-labored breathing noted.     Marla Santillan  11/29/2018  2:16 AM

## 2018-11-29 NOTE — PLAN OF CARE
Problem: Patient Care Overview  Goal: Discharge Needs Assessment  Outcome: No Change  Spoke with Maxine at [facility] this afternoon she states that pt been approved and looking at an admission date sometime next week. Maxine stated that pt Rule 25 and commitment papers would need to be sent to pt insurance. Left a message with pt  and CD . Pt stated his insurance is going to be ending due to being fired from his job, but reports he received a call from [facility] this morning stating he has active MA from November 1st - spoke with Dania she confirmed pt does have active MA however it is straight medicaid which does not cover the cost of treatment so I left another message with pt  to discuss the [facility] finding a funding source for treatment. Talked with pt this afternoon pt was upset due to not being able to get a hold of his  to file an appeal which needed to be done within 14 days and pt has not heard back from his  yet.     Writer has not spoken with pt regarding his MA not covering the cost of treatment as writer has not heard back from pt  yet about [facility] funding.

## 2018-11-29 NOTE — PLAN OF CARE
Face to face end of shift report received from Marla CHOI. Rounding completed. Patient observed.     Pirya Martini  11/29/2018  7:49 AM

## 2018-11-29 NOTE — PLAN OF CARE
Face to face end of shift report received from Priya WELCH RN. Rounding completed. Patient observed in bed awake.     Janki De Leon  11/29/2018  4:41 PM

## 2018-11-29 NOTE — PLAN OF CARE
"Problem: Patient Care Overview  Goal: Individualization & Mutuality  Patient will be cooperative with assessments  Patient will eat at least 75% meals  Patient will sleep 6-8 hours     Pt irritable during assessment this morning. Pt eating breakfast when writer attempted to give him his nicotine patch and medication. Pt stood up abruptly and took off his sweatshirt and said \"I'm just trying to eat my breakfast.\" Writer told pt would attempt to talk more later. Pt withdrawn from others at times and spends time sitting in his room. Pt frustrated with the length of time its taking to get into treatment.     Problem: Depressive Symptoms  Goal: Depressive Symptoms  Signs and symptoms of listed problems will be absent or manageable.  Patient's depression will be improved by discharge  Patient will remain calm and in control    Patient will verbalize if anxiety and or anger is presenting a problem.   Pt irritable this shift. Pt given Zyprexa 10 mg at 1108 per requested for agitation and irritability.      "

## 2018-11-29 NOTE — PROGRESS NOTES
"[facility]  Psychiatric Progress Note    Subjective   This is a 28 year old male with MDD, recurrent/severe/w/o psychosis, PTSD, ETOH use disorder, moderate, dependence w/o withdrawal, and TBI.     Ongoing c/o anxiety. Would like something as needed. Attempted to discuss anti-depressant use; however, he did not feel this would work well as they \"take time\" to kick in. Agreeable to trying clonidine as needed. Also discussed low dose seroquel, which would probably benefit his mood as well. He was reluctant, believing this would put him to sleep, but agreed to an as needed order and he will try it if he changes his mind. Reported that he is trying to contact his  in order to appeal his commitment. Indicated that he has until tomorrow to do so.          DIagnoses:   MDD, Recurrent/Severe w/o psychosis  PTSD  Alcohol Use Disorder/Moderate/Dependence w/o withdrawal  TBI, unspecified    Attestation:  Patient has been seen and evaluated by me,  Carlos Page NP          Interim History:   The patient's care was discussed with the treatment team and chart notes were reviewed.          Medications:       busPIRone  10 mg Oral BID     nicotine  1 patch Transdermal Daily     nicotine   Transdermal Q8H     nicotine   Transdermal Daily     prazosin  1 mg Oral At Bedtime     acetaminophen, alum & mag hydroxide-simethicone, bisacodyl, cloNIDine, hydrOXYzine, magnesium hydroxide, nicotine, OLANZapine **OR** OLANZapine, QUEtiapine, traZODone          Allergies:   No Known Allergies         Psychiatric Examination:   /54  Pulse 71  Temp 96.6  F (35.9  C) (Tympanic)  Resp 12  Ht 1.727 m (5' 8\")  Wt 71.1 kg (156 lb 11.2 oz)  SpO2 96%  BMI 23.83 kg/m2  Weight is 156 lbs 11.2 oz  Body mass index is 23.83 kg/(m^2).    Appearance:awake, alert  Attitude: Cooperative  Eye Contact: normal  Mood: depressed, irritable  Affect:  mood congruent  Speech:  clear, coherent  Psychomotor Behavior:  no evidence of " tardive dyskinesia, dystonia, or tics and intact station, gait and muscle tone  Thought Process:  Logical, linear, goal oriented  Associations:  no loose associations  Thought Content:  no evidence of suicidal ideation or homicidal ideation  Insight:  fair  Judgment:  fair  Oriented to:  time, person, and place  Attention Span and Concentration:  intact  Recent and Remote Memory:  intact  Fund of Knowledge: appropriate  Muscle Strength and Tone: normal  Gait and Station: Normal   Perception: no perceptual disorder noted.         Labs:   No results found for this or any previous visit (from the past 24 hour(s)).          Assessment/ Plan:   Add Clonidine 0.1mg bid prn for anxiety.  Add Seroquel 25-50mg tid prn for anxiety.  Referral to Caitlin

## 2018-11-29 NOTE — PLAN OF CARE
Problem: Patient Care Overview  Goal: Individualization & Mutuality  Patient will be cooperative with assessments  Patient will eat at least 75% meals  Patient will sleep 6-8 hours     Outcome: Improving  Patient has been irritable at times this shift.  He states he is frustrated with being here.  He attended some groups and was out in the lounge for a while but does not really engage with peers.  He showered this shift and was polite with staff when requesting supplies.  Requested PRN medication and received Trazodone 100 mg for sleep and Zyprexa 10 mg for agitation at 2015.  No complaints of pain.  VS WNL.     Problem: Depressive Symptoms  Goal: Depressive Symptoms  Signs and symptoms of listed problems will be absent or manageable.  Patient's depression will be improved by discharge  Patient will remain calm and in control    Patient will verbalize if anxiety and or anger is presenting a problem.   Outcome: No Change  Patient remains depressed and frustrated.  He has a flat blunted affect and gives minimal answers during assessment.

## 2018-11-29 NOTE — PLAN OF CARE
Problem: Patient Care Overview  Goal: Team Discussion  Team Plan:   BEHAVIORAL TEAM DISCUSSION    Participants: Mahsa Iraheta NP, Carlos Page NP, Lisa Daniels NP, Veronica Oconnor LSW,  Zenaida Willams LSW, Smitha Pisano RN, Laura Portillo RN, Robert Mills RN, Lor Kolb [employer], Reyna Hankins OT, Elizabeth Tucker OT, Elizabeth Wilson OT  Progress: fair, flat, low mood  Continued Stay Criteria/Rationale: adjusting meds  Medical/Physical: TBI  Precautions:   Behavioral Orders   Procedures     Code 1 - Restrict to Unit     Routine Programming     As clinically indicated     Self Injury Precaution     Status 15     Every 15 minutes.     Plan: revoked, on the list for Teofilo  Rationale for change in precautions or plan: None    Patient Active Problem List   Diagnosis     Psychosis (H)       Current Facility-Administered Medications:      acetaminophen (TYLENOL) tablet 650 mg, 650 mg, Oral, Q4H PRN, Mahsa Iraheta NP, 650 mg at 11/26/18 2045     alum & mag hydroxide-simethicone (MYLANTA ES/MAALOX  ES) suspension 30 mL, 30 mL, Oral, Q4H PRN, Mahsa Iraheta NP     bisacodyl (DULCOLAX) Suppository 10 mg, 10 mg, Rectal, Daily PRN, Mahsa Iraheta NP     busPIRone (BUSPAR) tablet 10 mg, 10 mg, Oral, BID, Carlos Page NP, 10 mg at 11/29/18 0814     hydrOXYzine (ATARAX) tablet  mg,  mg, Oral, Q4H PRN, Carlos Page NP, 100 mg at 11/26/18 1258     magnesium hydroxide (MILK OF MAGNESIA) suspension 30 mL, 30 mL, Oral, At Bedtime PRN, Mahsa Iraheta NP     nicotine (NICODERM CQ) 21 MG/24HR 24 hr patch 1 patch, 1 patch, Transdermal, Daily, Carlos Page NP, 1 patch at 11/29/18 0813     nicotine Patch in Place, , Transdermal, Q8H, Carlos Page NP     nicotine patch REMOVAL, , Transdermal, Daily, Carlos Page NP, Stopped at 11/24/18 0833     nicotine polacrilex lozenge 4 mg, 4 mg, Buccal, Q2H PRN, Carlos Page, NP     OLANZapine (zyPREXA) tablet 10 mg, 10 mg, Oral, Q2H PRN, 10 mg at  11/28/18 2015 **OR** OLANZapine (zyPREXA) injection 10 mg, 10 mg, Intramuscular, Q2H PRN, Mahsa Iraheta NP     prazosin (MINIPRESS) capsule 1 mg, 1 mg, Oral, At Bedtime, Carlos Page NP, 1 mg at 11/28/18 2015     traZODone (DESYREL) tablet 100 mg, 100 mg, Oral, At Bedtime PRN, Carlos Page, NP, 100 mg at 11/28/18 2015

## 2018-11-30 PROCEDURE — 25000132 ZZH RX MED GY IP 250 OP 250 PS 637: Performed by: NURSE PRACTITIONER

## 2018-11-30 PROCEDURE — 12400011

## 2018-11-30 PROCEDURE — 99232 SBSQ HOSP IP/OBS MODERATE 35: CPT | Performed by: NURSE PRACTITIONER

## 2018-11-30 RX ADMIN — QUETIAPINE FUMARATE 50 MG: 25 TABLET ORAL at 20:24

## 2018-11-30 RX ADMIN — NICOTINE 1 PATCH: 21 PATCH, EXTENDED RELEASE TRANSDERMAL at 08:21

## 2018-11-30 RX ADMIN — QUETIAPINE FUMARATE 50 MG: 25 TABLET ORAL at 12:11

## 2018-11-30 RX ADMIN — CLONIDINE HYDROCHLORIDE 0.1 MG: 0.1 TABLET ORAL at 08:23

## 2018-11-30 RX ADMIN — TRAZODONE HYDROCHLORIDE 100 MG: 100 TABLET ORAL at 20:24

## 2018-11-30 RX ADMIN — BUSPIRONE HYDROCHLORIDE 10 MG: 10 TABLET ORAL at 08:21

## 2018-11-30 RX ADMIN — PHENYTOIN 1 MG: 125 SUSPENSION ORAL at 20:24

## 2018-11-30 RX ADMIN — BUSPIRONE HYDROCHLORIDE 10 MG: 10 TABLET ORAL at 20:24

## 2018-11-30 ASSESSMENT — ACTIVITIES OF DAILY LIVING (ADL)
GROOMING: INDEPENDENT
DRESS: INDEPENDENT;SCRUBS (BEHAVIORAL HEALTH)
ORAL_HYGIENE: INDEPENDENT
LAUNDRY: UNABLE TO COMPLETE
ORAL_HYGIENE: INDEPENDENT
DRESS: SCRUBS (BEHAVIORAL HEALTH)
GROOMING: INDEPENDENT

## 2018-11-30 NOTE — PLAN OF CARE
Face to face end of shift report received from Priya WELCH RN. Rounding completed. Patient observed in room.     Padmaja Cabello  11/30/2018  4:24 PM

## 2018-11-30 NOTE — PLAN OF CARE
Problem: Patient Care Overview  Goal: Individualization & Mutuality  Patient will be cooperative with assessments  Patient will eat at least 75% meals  Patient will sleep 6-8 hours     Pt cooperative with nursing assessment this shift. Pt apologized for being so irritable yesterday and said he is frustrated with the county and  and not being able to get in contact with anyone and his appeal has to be done by today. Pt given Clonidine 0.1 mg at 0825 for 6/10 anxiety. Pt eats meals in the lobby and interacts with some of the other patients. Pt given Seroquel 50 mg at 1211 per request for 8/10 anxiety.

## 2018-11-30 NOTE — PLAN OF CARE
Problem: Patient Care Overview  Goal: Individualization & Mutuality  Patient will be cooperative with assessments  Patient will eat at least 75% meals  Patient will sleep 6-8 hours     Outcome: No Change  Pt has been in bed with eyes closed and regular respirations observed all night. Will continue to monitor.

## 2018-11-30 NOTE — PLAN OF CARE
Face to face end of shift report received from Janki MARQUIS RN. Rounding completed. Patient observed.     Kwasi Valdes  11/30/2018  12:54 AM

## 2018-11-30 NOTE — PROGRESS NOTES
"[facility]  Psychiatric Progress Note    Subjective   This is a 28 year old male with MDD, recurrent/severe/w/o psychosis, PTSD, ETOH use disorder, moderate, dependence w/o withdrawal, and TBI.     Is unsure if PRN Clonidine or Seroquel were helpful with anxiety; however, he appears notably improved today. Is up, showered, made his bed, lights are on in the room and he followed me out of the room onto the unit. Affect brighter. Generally he is in bed or sitting on the bed in the dark when we speak. He did speak with someone at the [facility] today regarding funding and is hopeful about discharge plans. Asked me again to write a letter expressing that he did not need to be here for this period of time so that he could appeal the Critical access hospital. It does not appear this would be helpful as his  indicated that an appeal would not be filed. Tentative discharge planned for [facility] on December 5th. Waiting on denial letter from [facility] so that Critical access hospital funding can be secured.          DIagnoses:   MDD, Recurrent/Severe w/o psychosis  PTSD  Alcohol Use Disorder/Moderate/Dependence w/o withdrawal  TBI, unspecified    Attestation:  Patient has been seen and evaluated by me,  Carlos Page NP          Interim History:   The patient's care was discussed with the treatment team and chart notes were reviewed.          Medications:       busPIRone  10 mg Oral BID     nicotine  1 patch Transdermal Daily     nicotine   Transdermal Q8H     nicotine   Transdermal Daily     prazosin  1 mg Oral At Bedtime     acetaminophen, alum & mag hydroxide-simethicone, bisacodyl, cloNIDine, hydrOXYzine, magnesium hydroxide, nicotine, OLANZapine **OR** OLANZapine, QUEtiapine, traZODone          Allergies:   No Known Allergies         Psychiatric Examination:   /58  Pulse 74  Temp 97  F (36.1  C) (Tympanic)  Resp 14  Ht 1.727 m (5' 8\")  Wt 71.1 kg (156 lb 11.2 oz)  SpO2 97%  BMI 23.83 kg/m2  Weight is 156 lbs 11.2 oz  Body mass " index is 23.83 kg/(m^2).    Appearance:awake, alert, good hygiene  Attitude: Cooperative  Eye Contact: normal  Mood: a little more positive today  Affect:  More responsive  Speech:  clear, coherent  Psychomotor Behavior:  no evidence of tardive dyskinesia, dystonia, or tics and intact station, gait and muscle tone  Thought Process:  Logical, linear, goal oriented  Associations:  no loose associations  Thought Content:  no evidence of suicidal ideation or homicidal ideation  Insight:  fair  Judgment:  fair  Oriented to:  time, person, and place  Attention Span and Concentration:  intact  Recent and Remote Memory:  intact  Fund of Knowledge: appropriate  Muscle Strength and Tone: normal  Gait and Station: Normal   Perception: no perceptual disorder noted.         Labs:   No results found for this or any previous visit (from the past 24 hour(s)).          Assessment/ Plan:   No changes to medications.  Referral to [facility] - tentative discharge date of December 5th.

## 2018-11-30 NOTE — PLAN OF CARE
Problem: Patient Care Overview  Goal: Team Discussion  Team Plan:   BEHAVIORAL TEAM DISCUSSION    Participants: Yara Kaiser NP, Mahsa Iraheta NP,Carlos Page NP, Veronica Oconnor LSW,  Zenaida Willams LSW, Robert Mills RN, Reyna Hankins OT, Elizabeth Tucker OT  Progress: good attends unit programming   Continued Stay Criteria/Rationale: ordered clonidine and PRN seroquel - continue with other medications   Medical/Physical: none known   Precautions:   Behavioral Orders   Procedures     Code 1 - Restrict to Unit     Routine Programming     As clinically indicated     Self Injury Precaution     Status 15     Every 15 minutes.     Plan: committed through [facility] - on the list for [facility]   Rationale for change in precautions or plan: none     Patient Active Problem List   Diagnosis     Psychosis (H)       Current Facility-Administered Medications:      acetaminophen (TYLENOL) tablet 650 mg, 650 mg, Oral, Q4H PRN, Mahsa Iraheta NP, 650 mg at 11/26/18 2045     alum & mag hydroxide-simethicone (MYLANTA ES/MAALOX  ES) suspension 30 mL, 30 mL, Oral, Q4H PRN, Mahsa Iraheta NP     bisacodyl (DULCOLAX) Suppository 10 mg, 10 mg, Rectal, Daily PRN, Mahsa Iraheta NP     busPIRone (BUSPAR) tablet 10 mg, 10 mg, Oral, BID, Carlos Page NP, 10 mg at 11/30/18 0821     cloNIDine (CATAPRES) tablet 0.1 mg, 0.1 mg, Oral, BID PRN, Carlos Page NP, 0.1 mg at 11/30/18 0823     hydrOXYzine (ATARAX) tablet  mg,  mg, Oral, Q4H PRN, Carlos Page NP, 100 mg at 11/26/18 1258     magnesium hydroxide (MILK OF MAGNESIA) suspension 30 mL, 30 mL, Oral, At Bedtime PRN, Mahsa Iraheta NP     nicotine (NICODERM CQ) 21 MG/24HR 24 hr patch 1 patch, 1 patch, Transdermal, Daily, Carlos Page NP, 1 patch at 11/30/18 0821     nicotine Patch in Place, , Transdermal, Q8H, Carlos Page NP     nicotine patch REMOVAL, , Transdermal, Daily, Carlos Page, NP, Stopped at 11/24/18 9926     nicotine  polacrilex lozenge 4 mg, 4 mg, Buccal, Q2H PRN, Carlos Page, NP     OLANZapine (zyPREXA) tablet 10 mg, 10 mg, Oral, Q2H PRN, 10 mg at 11/29/18 1108 **OR** OLANZapine (zyPREXA) injection 10 mg, 10 mg, Intramuscular, Q2H PRN, Mahsa Iraheta, NP     prazosin (MINIPRESS) capsule 1 mg, 1 mg, Oral, At Bedtime, Carlos Page NP, 1 mg at 11/29/18 2000     QUEtiapine (SEROquel) tablet 25-50 mg, 25-50 mg, Oral, TID PRN, Carlos Page NP, 50 mg at 11/29/18 2000     traZODone (DESYREL) tablet 100 mg, 100 mg, Oral, At Bedtime PRN, Carlos Page NP, 100 mg at 11/29/18 2000

## 2018-11-30 NOTE — PLAN OF CARE
"Problem: Patient Care Overview  Goal: Discharge Needs Assessment  Outcome: No Change  Spoke with Maggie with [facility] and she states that they will be paying for treatment with [facility] funds due to pt BCBS ending and dalila RAMIREZ will not cover treatment. Maggie reported that [facility] will not accept [facility] as a source of payment - writer called and left a message with Bridget at [facility] to clarify. Spoke with Bridget she states that she will submit the paperwork to [facility] as that is what it is showing currently for insurance - she states that even though they know pt insurance is ending they need the denial from insurance before they can accept [facility]. Bridget stated they are planning a tenatively admission date for December 5th dependent on hearing back from [facility] she is hopeful to hear by Monday - Teofilo uses [facility] in [place], [place]     Spoke with pt this afternoon - gave an update and informed pt of the tentative admission date explained we should know for sure on Monday - pt appeared upset and stated he was not happy with his  not filing the appeal states \"it's my fucking life and my fucking choice\"    "

## 2018-11-30 NOTE — PLAN OF CARE
Face to face end of shift report received from Kwasi CHOI. Rounding completed. Patient observed.     Priya Martini  11/30/2018  7:49 AM

## 2018-11-30 NOTE — PLAN OF CARE
Problem: Patient Care Overview  Goal: Individualization & Mutuality  Patient will be cooperative with assessments  Patient will eat at least 75% meals  Patient will sleep 6-8 hours     Outcome: No Change  Patient out in the lounge watching tv with peers part of this shift.  He is dismissive of staff and doesn't want to talk much.  He will cooperate  with nursing assessment.  Denies suicidal thoughts.  Requested PRN trazadone and Seroquel at Hs and received both at 2000.  Patient asking questions regarding new medications.  Gave handouts on Seroquel and Clonidine.  No complaints of pain.  VS WNL.     Problem: Depressive Symptoms  Goal: Depressive Symptoms  Signs and symptoms of listed problems will be absent or manageable.  Patient's depression will be improved by discharge  Patient will remain calm and in control    Patient will verbalize if anxiety and or anger is presenting a problem.   Outcome: No Change  Patient remains depressed and flat.  He is irritable most of the shift and states that he is really frustrated with his court situation.  He is worried he will not be able to file his appeal in time.

## 2018-12-01 PROCEDURE — 25000132 ZZH RX MED GY IP 250 OP 250 PS 637: Performed by: NURSE PRACTITIONER

## 2018-12-01 PROCEDURE — 12400011

## 2018-12-01 PROCEDURE — 99232 SBSQ HOSP IP/OBS MODERATE 35: CPT | Performed by: NURSE PRACTITIONER

## 2018-12-01 RX ADMIN — QUETIAPINE FUMARATE 50 MG: 25 TABLET ORAL at 20:11

## 2018-12-01 RX ADMIN — TRAZODONE HYDROCHLORIDE 100 MG: 100 TABLET ORAL at 20:11

## 2018-12-01 RX ADMIN — BUSPIRONE HYDROCHLORIDE 10 MG: 10 TABLET ORAL at 20:11

## 2018-12-01 RX ADMIN — PHENYTOIN 1 MG: 125 SUSPENSION ORAL at 20:11

## 2018-12-01 RX ADMIN — TRAZODONE HYDROCHLORIDE 100 MG: 100 TABLET ORAL at 21:33

## 2018-12-01 RX ADMIN — NICOTINE 1 PATCH: 21 PATCH, EXTENDED RELEASE TRANSDERMAL at 08:17

## 2018-12-01 RX ADMIN — BUSPIRONE HYDROCHLORIDE 10 MG: 10 TABLET ORAL at 08:17

## 2018-12-01 ASSESSMENT — ACTIVITIES OF DAILY LIVING (ADL)
LAUNDRY: UNABLE TO COMPLETE
ORAL_HYGIENE: INDEPENDENT
GROOMING: INDEPENDENT
DRESS: INDEPENDENT;SCRUBS (BEHAVIORAL HEALTH)

## 2018-12-01 NOTE — PROGRESS NOTES
"[facility]  Psychiatric Progress Note    Subjective   This is a 28 year old male with MDD, recurrent/severe/w/o psychosis, PTSD, ETOH use disorder, moderate, dependence w/o withdrawal, and TBI.     Family is coming to visit today. Was demanding his debit card to give to his girlfriend to buy groceries. He did become fairly worked up when told that this was not a possibility. Arrangements made so that he can transfer money from his bank account to hers, which did calm him some. Denied SI/HI and symptoms of psychosis.          DIagnoses:   MDD, Recurrent/Severe w/o psychosis  PTSD  Alcohol Use Disorder/Moderate/Dependence w/o withdrawal  TBI, unspecified    Attestation:  Patient has been seen and evaluated by me,  Carlos Page NP          Interim History:   The patient's care was discussed with the treatment team and chart notes were reviewed.          Medications:       busPIRone  10 mg Oral BID     nicotine  1 patch Transdermal Daily     nicotine   Transdermal Q8H     nicotine   Transdermal Daily     prazosin  1 mg Oral At Bedtime     acetaminophen, alum & mag hydroxide-simethicone, bisacodyl, cloNIDine, hydrOXYzine, magnesium hydroxide, nicotine, OLANZapine **OR** OLANZapine, QUEtiapine, traZODone          Allergies:   No Known Allergies         Psychiatric Examination:   /63  Pulse 77  Temp 96.5  F (35.8  C) (Tympanic)  Resp 18  Ht 1.727 m (5' 8\")  Wt 71.1 kg (156 lb 11.2 oz)  SpO2 97%  BMI 23.83 kg/m2  Weight is 156 lbs 11.2 oz  Body mass index is 23.83 kg/(m^2).    Appearance: awake, alert  Attitude: primarily cooperative  Eye Contact: normal  Mood: labile  Affect:  More responsive  Speech:  clear, coherent  Psychomotor Behavior:  no evidence of tardive dyskinesia, dystonia, or tics and intact station, gait and muscle tone  Thought Process:  Logical, linear, goal oriented  Associations:  no loose associations  Thought Content:  no evidence of suicidal ideation or homicidal " ideation  Insight:  fair  Judgment:  fair  Oriented to:  time, person, and place  Attention Span and Concentration:  intact  Recent and Remote Memory:  intact  Fund of Knowledge: appropriate  Muscle Strength and Tone: normal  Gait and Station: Normal   Perception: no perceptual disorder noted.         Labs:   No results found for this or any previous visit (from the past 24 hour(s)).          Assessment/ Plan:   No changes to medications.  Referral to [facility] - tentative discharge date of December 5th.

## 2018-12-01 NOTE — PLAN OF CARE
"Problem: Patient Care Overview  Goal: Individualization & Mutuality  Patient will be cooperative with assessments  Patient will eat at least 75% meals  Patient will sleep 6-8 hours     Patient up on unit with peers, watching television and attending some groups this shift. Denies SI/HI, hallucinations and pain this shift. Reports depression and anxiety rated 7/10 stating \"I will be okay. I just want to go home.\" Flat/blunt affect, but clear speech and making good eye contact during conversation with this writer. Patient did become slightly upset this morning when informed we do not allow debit cards to be given out. Did quickly redirect though and allowed to use computer to transfer funds per provider approval, patient thankful after this. Declines offer for any PRN's this morning. Showered before lunch. Patients mother and fiance here to visit after lunch per approval, appeared to go well. Sitting in lounge at end of shift. Continue to monitor at this time.     Problem: Depressive Symptoms  Goal: Depressive Symptoms  Signs and symptoms of listed problems will be absent or manageable.  Patient's depression will be improved by discharge  Patient will remain calm and in control    Patient will verbalize if anxiety and or anger is presenting a problem.   Continue to monitor at this time.   Goal: Social and Therapeutic (Depression)  Signs and symptoms of listed problems will be absent or manageable.   Continue to monitor at this time.     "

## 2018-12-01 NOTE — PROGRESS NOTES
12/01/18 1313   Patient Belongings   Did you bring any home meds/supplements to the hospital?  No   Patient Belongings other (see comments)   Disposition of Belongings Locker  (belongings sent to carriebby in patient belongings room)   Belongings Search Yes   Clothing Search No   Second Staff [place] MALDONADO [facility] center   List items sent to safe: NA  All other belongings put in assigned cubby in belongings room.   Head and shoulders shampoo, Old spice deoderent, black journal,magazine and word search book        I have reviewed my belongings list on admission and verify that it is correct.     Patient signature_______________________________    Second staff witness (if patient unable to sign) ______________________________       I have received all my belongings at discharge.    Patient signature________________________________    Jania   12/1/2018  1:16 PM

## 2018-12-01 NOTE — PLAN OF CARE
Problem: Patient Care Overview  Goal: Individualization & Mutuality  Patient will be cooperative with assessments  Patient will eat at least 75% meals  Patient will sleep 6-8 hours     Pt is pleasant and cooperative with writer this shift.  Cooperative with nursing assessment.  Admits to some anxiety and depression. Denies SI, HI, pain, and hallucinations. Pt was napping at the begning of the shift.  Has been in lounge after dinner.  Has been social with peers and attending groups.  Pt requested PRN Seroquel 50 mg for anxiety and PRN trazodone 100 mg for sleep at 2024. Pt cooperative with all scheduled medications this shift.     Problem: Depressive Symptoms  Goal: Depressive Symptoms  Signs and symptoms of listed problems will be absent or manageable.  Patient's depression will be improved by discharge  Patient will remain calm and in control    Patient will verbalize if anxiety and or anger is presenting a problem.   Pt has been calm and cooperative.  Pt admits to some anxiety he dose utilize PRN's for this.

## 2018-12-01 NOTE — PLAN OF CARE
Face to face end of shift report received from Libertad MARQUIS RN. Rounding completed. [place] [facility]     Padmaja Cabello  12/1/2018  4:06 PM

## 2018-12-01 NOTE — PLAN OF CARE
Face to face end of shift report received from STEPH Giang. Rounding completed. Patient observed resting in bed.     ERICK LOONEY  12/1/2018  12:41 AM

## 2018-12-01 NOTE — PLAN OF CARE
Problem: Patient Care Overview  Goal: Individualization & Mutuality  Patient will be cooperative with assessments  Patient will eat at least 75% meals  Patient will sleep 6-8 hours     Pt is pleasant and cooperative with writer this shift.  Pt has been in lounge and social with peers.  Pt cooperative with nursing assessment.  Pt continues to endorse anxiety and depression.  States that he is ready to get out of there.  Denies SI, HI, pain, and hallucinations. Pt did not attend groups this shift.  Pt requested PRN Seroquel 50mg for anxiety and PRN trazodone 100 mg for sleep at 2011.  Cooperative with all scheduled medications this shift.      Problem: Depressive Symptoms  Goal: Depressive Symptoms  Signs and symptoms of listed problems will be absent or manageable.  Patient's depression will be improved by discharge  Patient will remain calm and in control    Patient will verbalize if anxiety and or anger is presenting a problem.   Pt continues to endorse anxiety and depression.  Pt has been calm and cooperative this shift.

## 2018-12-01 NOTE — PLAN OF CARE
Face to face end of shift report received from Selena MOSQUEDA RN. Rounding completed. Patient observed sitting in lounge with peers. No requests at this time.      Libertad Love  12/1/2018  8:40 AM

## 2018-12-01 NOTE — PLAN OF CARE
Problem: Patient Care Overview  Goal: Individualization & Mutuality  Patient will be cooperative with assessments  Patient will eat at least 75% meals  Patient will sleep 6-8 hours     Pt appears to be sleeping in bed with eyes closed, having regular respirations and position changes. 15 minutes and PRN safety checks completed with no noted complains. Will continue to monitor.           Problem: Depressive Symptoms  Goal: Social and Therapeutic (Depression)  Signs and symptoms of listed problems will be absent or manageable.   Pt appears to be sleeping in bed with eyes closed, having regular respirations and position changes. 15 minutes and PRN safety checks completed with no noted complains. Will continue to monitor.

## 2018-12-02 PROCEDURE — 12400011

## 2018-12-02 PROCEDURE — 25000132 ZZH RX MED GY IP 250 OP 250 PS 637: Performed by: NURSE PRACTITIONER

## 2018-12-02 RX ADMIN — BUSPIRONE HYDROCHLORIDE 10 MG: 10 TABLET ORAL at 08:02

## 2018-12-02 RX ADMIN — PHENYTOIN 1 MG: 125 SUSPENSION ORAL at 20:36

## 2018-12-02 RX ADMIN — TRAZODONE HYDROCHLORIDE 100 MG: 100 TABLET ORAL at 21:55

## 2018-12-02 RX ADMIN — NICOTINE 1 PATCH: 21 PATCH, EXTENDED RELEASE TRANSDERMAL at 08:03

## 2018-12-02 RX ADMIN — BUSPIRONE HYDROCHLORIDE 10 MG: 10 TABLET ORAL at 20:36

## 2018-12-02 RX ADMIN — TRAZODONE HYDROCHLORIDE 100 MG: 100 TABLET ORAL at 20:36

## 2018-12-02 RX ADMIN — QUETIAPINE FUMARATE 50 MG: 25 TABLET ORAL at 20:36

## 2018-12-02 ASSESSMENT — ACTIVITIES OF DAILY LIVING (ADL)
DRESS: INDEPENDENT;SCRUBS (BEHAVIORAL HEALTH)
ORAL_HYGIENE: INDEPENDENT
LAUNDRY: UNABLE TO COMPLETE
GROOMING: INDEPENDENT

## 2018-12-02 NOTE — PLAN OF CARE
Face to face end of shift report received from STEPH Giang. Rounding completed. Patient observed resting in bed.     ERICK LOONEY  12/2/2018  12:25 AM

## 2018-12-02 NOTE — PLAN OF CARE
Problem: Patient Care Overview  Goal: Individualization & Mutuality  Patient will be cooperative with assessments  Patient will eat at least 75% meals  Patient will sleep 6-8 hours     Pt appears to be sleeping in bed with eyes closed, having regular respirations and position changes. 15 minutes and PRN safety checks completed with no noted complains. Will continue to monitor.           Problem: Depressive Symptoms  Goal: Depressive Symptoms  Signs and symptoms of listed problems will be absent or manageable.  Patient's depression will be improved by discharge  Patient will remain calm and in control    Patient will verbalize if anxiety and or anger is presenting a problem.   Pt appears to be sleeping in bed with eyes closed, having regular respirations and position changes. 15 minutes and PRN safety checks completed with no noted complains. Will continue to monitor.

## 2018-12-02 NOTE — PLAN OF CARE
Face to face end of shift report received from Libertad MARQUIS RN. Rounding completed. [place] [facility]     Padmaja Cabello  12/2/2018  4:05 PM

## 2018-12-02 NOTE — PLAN OF CARE
Problem: Patient Care Overview  Goal: Individualization & Mutuality  Patient will be cooperative with assessments  Patient will eat at least 75% meals  Patient will sleep 6-8 hours     Pt is pleasant and cooperative with writer this shift.  He has been watching tv in lounge with peers.  Pt continues to endorse anxiety and depression.  States he is hopeful he will find out more about his discharge tomorrow.  Denies SI, HI, pain, and hallucinations.  Requested PRN Seroquel 50mg for anxiety and PRN trazodone 100 mg for sleep at 2036.    Problem: Depressive Symptoms  Goal: Depressive Symptoms  Signs and symptoms of listed problems will be absent or manageable.  Patient's depression will be improved by discharge  Patient will remain calm and in control    Patient will verbalize if anxiety and or anger is presenting a problem.   Pt continues to endorse some anxiety and depression.  Pt has been calm and cooperative this shift.  Pt has had not behavioral outbursts.

## 2018-12-02 NOTE — PLAN OF CARE
Problem: Patient Care Overview  Goal: Individualization & Mutuality  Patient will be cooperative with assessments  Patient will eat at least 75% meals  Patient will sleep 6-8 hours     Patient up on unit with peers, watching television and attending groups appropriately throughout shift. Flat/blunt affect but does appear to be in a better mood this shift. Friendly during conversation with this writer, laughing and smiling. Denies SI/HI, hallucinations, anxiety and pain this shift. Reports depression rated 5/10 d/t situation. Showered after breakfast and trimmed facial hair with staff supervision. Compliant with scheduled medications. Laying down to rest for about an hour this afternoon. Back up to lounge with peers by end of shift. Continue to monitor at this time.     Problem: Depressive Symptoms  Goal: Depressive Symptoms  Signs and symptoms of listed problems will be absent or manageable.  Patient's depression will be improved by discharge  Patient will remain calm and in control    Patient will verbalize if anxiety and or anger is presenting a problem.   Continue to monitor at this time.   Goal: Social and Therapeutic (Depression)  Signs and symptoms of listed problems will be absent or manageable.   Continue to monitor at this time.

## 2018-12-02 NOTE — PLAN OF CARE
Face to face end of shift report received from Selena MOSQUEDA RN. Rounding completed. Patient observed sitting in lounge with peers. No requests at this time.     Libertad Love  12/2/2018  9:11 AM

## 2018-12-03 PROCEDURE — 25000132 ZZH RX MED GY IP 250 OP 250 PS 637: Performed by: NURSE PRACTITIONER

## 2018-12-03 PROCEDURE — 99232 SBSQ HOSP IP/OBS MODERATE 35: CPT | Performed by: NURSE PRACTITIONER

## 2018-12-03 PROCEDURE — 12400011

## 2018-12-03 RX ORDER — TRAZODONE HYDROCHLORIDE 100 MG/1
200 TABLET ORAL
Status: DISCONTINUED | OUTPATIENT
Start: 2018-12-03 | End: 2018-12-05 | Stop reason: HOSPADM

## 2018-12-03 RX ORDER — BUSPIRONE HYDROCHLORIDE 15 MG/1
15 TABLET ORAL 2 TIMES DAILY
Status: DISCONTINUED | OUTPATIENT
Start: 2018-12-03 | End: 2018-12-05 | Stop reason: HOSPADM

## 2018-12-03 RX ADMIN — TRAZODONE HYDROCHLORIDE 200 MG: 100 TABLET ORAL at 20:22

## 2018-12-03 RX ADMIN — QUETIAPINE FUMARATE 50 MG: 25 TABLET ORAL at 14:43

## 2018-12-03 RX ADMIN — BUSPIRONE HYDROCHLORIDE 10 MG: 10 TABLET ORAL at 08:08

## 2018-12-03 RX ADMIN — QUETIAPINE FUMARATE 50 MG: 25 TABLET ORAL at 20:22

## 2018-12-03 RX ADMIN — BUSPIRONE HYDROCHLORIDE 15 MG: 15 TABLET ORAL at 20:22

## 2018-12-03 RX ADMIN — NICOTINE 1 PATCH: 21 PATCH, EXTENDED RELEASE TRANSDERMAL at 08:09

## 2018-12-03 RX ADMIN — PHENYTOIN 1 MG: 125 SUSPENSION ORAL at 20:23

## 2018-12-03 ASSESSMENT — ACTIVITIES OF DAILY LIVING (ADL)
DRESS: INDEPENDENT;SCRUBS (BEHAVIORAL HEALTH)
LAUNDRY: UNABLE TO COMPLETE
GROOMING: INDEPENDENT
ORAL_HYGIENE: INDEPENDENT

## 2018-12-03 NOTE — PLAN OF CARE
Face to face end of shift report received from STEPH Giang. Rounding completed. Patient observed resting in bed.     ERICK LOONEY  12/3/2018  1:11 AM

## 2018-12-03 NOTE — PLAN OF CARE
Problem: Depressive Symptoms  Goal: Depressive Symptoms  Signs and symptoms of listed problems will be absent or manageable.  Patient's depression will be improved by discharge  Patient will remain calm and in control    Patient will verbalize if anxiety and or anger is presenting a problem.   Pt has been pleasant and cooperative this shift.  Pt has been napping at the begining of the shift. He is looking forward to his up coming discharge.  Continues to endorse anxiety and depression.  Denies SI, HI, pain, and hallucinations.  Requested PRN Seroquel 50mg for anxiety and PRN trazodone 200 mg for sleep at 2022.  Cooperative with all scheduled medications this shift. Did attend group this shift.     Goal: Social and Therapeutic (Depression)  Signs and symptoms of listed problems will be absent or manageable.   Pt continues to endorse depression.

## 2018-12-03 NOTE — PLAN OF CARE
Problem: Patient Care Overview  Goal: Discharge Needs Assessment  Outcome: No Change  Spoke with Fawn at [facility] she stated that they would be able to pick pt up at 9:00 on Wednesday morning - Pat stated pt will need to come with a 30 day supply of medications in hand - spoke with Eusebio at [facility] pt is scheduled to have a remote PD on Tuesday morning.     Left message with pt , Pamela. Spoke with Maggie and she stated that pt insurance did end on the 30th so they will be funding treatment with consolidated funds. Spoke with pt - pt was pleased to have an admission date and states he is ready to move forward. Pt still expressed frustration with his .

## 2018-12-03 NOTE — PLAN OF CARE
Face to face end of shift report received from Libertad MARQUIS RN. Rounding completed. Patient observed in room.     Padmaja Cabello  12/3/2018  4:09 PM

## 2018-12-03 NOTE — PROGRESS NOTES
"[facility]  Psychiatric Progress Note    Subjective   This is a 28 year old male with MDD, recurrent/severe/w/o psychosis, PTSD, ETOH use disorder, moderate, dependence w/o withdrawal, and TBI.     Is looking forward to discharge Wednesday. Much more positive today. Has been going to groups. Would like Trazodone increased as he has been taking both doses. This helps him sleep and does not cause a hang-over. Also would like to try a higher dose of Buspar. He feels like this helping.          DIagnoses:   MDD, Recurrent/Severe w/o psychosis  PTSD  Alcohol Use Disorder/Moderate/Dependence w/o withdrawal  TBI, unspecified    Attestation:  Patient has been seen and evaluated by me,  Carlos Page NP          Interim History:   The patient's care was discussed with the treatment team and chart notes were reviewed.          Medications:       busPIRone  15 mg Oral BID     nicotine  1 patch Transdermal Daily     nicotine   Transdermal Q8H     nicotine   Transdermal Daily     prazosin  1 mg Oral At Bedtime     acetaminophen, alum & mag hydroxide-simethicone, bisacodyl, cloNIDine, hydrOXYzine, magnesium hydroxide, nicotine, OLANZapine **OR** OLANZapine, QUEtiapine, traZODone          Allergies:   No Known Allergies         Psychiatric Examination:   /60  Pulse 74  Temp 97.8  F (36.6  C) (Tympanic)  Resp 18  Ht 1.727 m (5' 8\")  Wt 72.7 kg (160 lb 3.2 oz)  SpO2 97%  BMI 24.36 kg/m2  Weight is 160 lbs 3.2 oz  Body mass index is 24.36 kg/(m^2).    Appearance: awake, alert, appropriately groomed  Attitude: pleasant, cooperative  Eye Contact: good  Mood: improved  Affect:  congruent  Speech:  clear, coherent  Psychomotor Behavior:  no evidence of tardive dyskinesia, dystonia, or tics and intact station, gait and muscle tone  Thought Process:  Logical, linear, goal oriented  Associations:  no loose associations  Thought Content:  no evidence of suicidal ideation or homicidal ideation  Insight:  " fair  Judgment:  fair  Oriented to:  time, person, and place  Attention Span and Concentration:  intact  Recent and Remote Memory:  intact  Fund of Knowledge: appropriate  Muscle Strength and Tone: normal  Gait and Station: Normal   Perception: no perceptual disorder noted.         Labs:   No results found for this or any previous visit (from the past 24 hour(s)).          Assessment/ Plan:   Increase Trazodone to 200mg at bed - discontinue 2nd dose option.  Increase Buspar to 15mg twice daily  Discharge to [facility] on Wednesday 12/5

## 2018-12-03 NOTE — PLAN OF CARE
Problem: Patient Care Overview  Goal: Individualization & Mutuality  Patient will be cooperative with assessments  Patient will eat at least 75% meals  Patient will sleep 6-8 hours     Patient up on unit with peers this morning, compliant with scheduled medications. Showered after breakfast. Reports being ready to discharge on Wednesday. Isolative/withdrawn to bedroom more this morning. Did visit with this writer for a long period, talks about drinking, family and future goals. Provides positive insight to situation and treatment. Affect brighter this afternoon and back out to lounge by lunch. Reports anxiety rated 6/10 this afternoon d/t unit noise.   1443- Requested/Received PRN Seroquel 50 mg. Laying in bed to rest by end of shift. Continue to monitor at this time.     Problem: Depressive Symptoms  Goal: Depressive Symptoms  Signs and symptoms of listed problems will be absent or manageable.  Patient's depression will be improved by discharge  Patient will remain calm and in control    Patient will verbalize if anxiety and or anger is presenting a problem.   Continue to monitor at this time.   Goal: Social and Therapeutic (Depression)  Signs and symptoms of listed problems will be absent or manageable.   Continue to monitor at this time.

## 2018-12-03 NOTE — PLAN OF CARE
Face to face end of shift report received from Selena MOSQUEDA RN. Rounding completed. Patient observed sitting in lounge with peers. No requests at this time.     Libertad Love  12/3/2018  8:42 AM

## 2018-12-04 ENCOUNTER — TRANSFERRED RECORDS (OUTPATIENT)
Dept: HEALTH INFORMATION MANAGEMENT | Facility: CLINIC | Age: 28
End: 2018-12-04

## 2018-12-04 PROCEDURE — 99239 HOSP IP/OBS DSCHRG MGMT >30: CPT | Performed by: NURSE PRACTITIONER

## 2018-12-04 PROCEDURE — 25000132 ZZH RX MED GY IP 250 OP 250 PS 637: Performed by: NURSE PRACTITIONER

## 2018-12-04 PROCEDURE — 12400011

## 2018-12-04 RX ORDER — TRAZODONE HYDROCHLORIDE 100 MG/1
200 TABLET ORAL
Qty: 60 TABLET | Refills: 1 | Status: SHIPPED | OUTPATIENT
Start: 2018-12-04

## 2018-12-04 RX ORDER — HYDROXYZINE HYDROCHLORIDE 50 MG/1
50-100 TABLET, FILM COATED ORAL EVERY 4 HOURS PRN
Qty: 60 TABLET | Refills: 1 | Status: SHIPPED | OUTPATIENT
Start: 2018-12-04

## 2018-12-04 RX ORDER — QUETIAPINE FUMARATE 25 MG/1
25-50 TABLET, FILM COATED ORAL 3 TIMES DAILY PRN
Qty: 60 TABLET | Refills: 1 | Status: SHIPPED | OUTPATIENT
Start: 2018-12-04

## 2018-12-04 RX ORDER — BUSPIRONE HYDROCHLORIDE 15 MG/1
15 TABLET ORAL 2 TIMES DAILY
Qty: 60 TABLET | Refills: 1 | Status: SHIPPED | OUTPATIENT
Start: 2018-12-04

## 2018-12-04 RX ORDER — PRAZOSIN HYDROCHLORIDE 1 MG/1
1 CAPSULE ORAL AT BEDTIME
Qty: 30 CAPSULE | Refills: 1 | Status: SHIPPED | OUTPATIENT
Start: 2018-12-04

## 2018-12-04 RX ORDER — NICOTINE 21 MG/24HR
1 PATCH, TRANSDERMAL 24 HOURS TRANSDERMAL DAILY
Qty: 30 PATCH | Refills: 1 | Status: SHIPPED | OUTPATIENT
Start: 2018-12-05

## 2018-12-04 RX ADMIN — BUSPIRONE HYDROCHLORIDE 15 MG: 15 TABLET ORAL at 20:03

## 2018-12-04 RX ADMIN — NICOTINE 1 PATCH: 21 PATCH, EXTENDED RELEASE TRANSDERMAL at 08:43

## 2018-12-04 RX ADMIN — BUSPIRONE HYDROCHLORIDE 15 MG: 15 TABLET ORAL at 08:41

## 2018-12-04 RX ADMIN — PHENYTOIN 1 MG: 125 SUSPENSION ORAL at 20:03

## 2018-12-04 RX ADMIN — TRAZODONE HYDROCHLORIDE 200 MG: 100 TABLET ORAL at 20:05

## 2018-12-04 RX ADMIN — QUETIAPINE FUMARATE 50 MG: 25 TABLET ORAL at 20:05

## 2018-12-04 ASSESSMENT — ACTIVITIES OF DAILY LIVING (ADL)
ORAL_HYGIENE: INDEPENDENT
GROOMING: INDEPENDENT
DRESS: SCRUBS (BEHAVIORAL HEALTH)

## 2018-12-04 NOTE — PLAN OF CARE
Face to face end of shift report received from Kwasi LIRIANO RN. Rounding completed. Patient observed sitting in lounge with peers. No requests at this time.     Libertad Love  12/4/2018  9:16 AM

## 2018-12-04 NOTE — PLAN OF CARE
Face to face end of shift report received from Padmaja HUDSON RN. Rounding completed. Patient observed.     Kwasi Valdes  12/4/2018  12:01 AM
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
## 2018-12-04 NOTE — PLAN OF CARE
"Problem: Patient Care Overview  Goal: Individualization & Mutuality  Patient will be cooperative with assessments  Patient will eat at least 75% meals  Patient will sleep 6-8 hours     Patient up on unit with peers watching television and attending some groups this shift. Denies all criteria stating, \"I'm just frustrated about being here still. I'm ready to go.\" A bit irritable in beginning of shift with staff and student nurse, laying down to rest/nap after breakfast. Back out to [facility] [place] by 1000 and apologizing to student for not visiting yet. Compliant with scheduled medications. Showered before lunch. Appeared to be in a better mood this afternoon, smiling and joking with this writer. Continue to monitor at this time.     Problem: Depressive Symptoms  Goal: Depressive Symptoms  Signs and symptoms of listed problems will be absent or manageable.  Patient's depression will be improved by discharge  Patient will remain calm and in control    Patient will verbalize if anxiety and or anger is presenting a problem.   Continue to monitor at this time.   Goal: Social and Therapeutic (Depression)  Signs and symptoms of listed problems will be absent or manageable.   Continue to monitor at this time.       "

## 2018-12-04 NOTE — PLAN OF CARE
Face to face end of shift report received from Libertad MARQUIS RN. Rounding completed. Patient observed.     Ami Vazquez  12/4/2018  4:06 PM

## 2018-12-04 NOTE — PLAN OF CARE
Problem: Patient Care Overview  Goal: Team Discussion  Team Plan:   BEHAVIORAL TEAM DISCUSSION    Participants:  Mahsa Iraheta NP,Carlos Page NP, Lisa Daniels NP,  Veronica Oconnor LSW,  Zenaida Willams LSW, Libertad Love RN, Lor Kolb Recreational Therapist, Elizabeth Tucker OT  Progress: good attends unit programming   Continued Stay Criteria/Rationale: continue with current medications   Medical/Physical: history of TBI   Precautions:   Behavioral Orders   Procedures     Code 1 - Restrict to Unit     Routine Programming     As clinically indicated     Self Injury Precaution     Status 15     Every 15 minutes.     Plan: revoked, full commit - remote PD today at 11:00 and discharge to [facility] Wednesday morning @ 9:00  Rationale for change in precautions or plan: none     Patient Active Problem List   Diagnosis     Psychosis (H)       Current Facility-Administered Medications:      acetaminophen (TYLENOL) tablet 650 mg, 650 mg, Oral, Q4H PRN, Mahsa Iraheta NP, 650 mg at 11/26/18 2045     alum & mag hydroxide-simethicone (MYLANTA ES/MAALOX  ES) suspension 30 mL, 30 mL, Oral, Q4H PRN, Mahsa Iraheta NP     bisacodyl (DULCOLAX) Suppository 10 mg, 10 mg, Rectal, Daily PRN, Mahsa Iraheta NP     busPIRone (BUSPAR) tablet 15 mg, 15 mg, Oral, BID, Carlos Page NP, 15 mg at 12/04/18 0841     cloNIDine (CATAPRES) tablet 0.1 mg, 0.1 mg, Oral, BID PRN, Carlos Page NP, 0.1 mg at 11/30/18 0823     hydrOXYzine (ATARAX) tablet  mg,  mg, Oral, Q4H PRN, Carlos Page NP, 100 mg at 11/26/18 1258     magnesium hydroxide (MILK OF MAGNESIA) suspension 30 mL, 30 mL, Oral, At Bedtime PRN, Mahsa Iraheta NP     nicotine (NICODERM CQ) 21 MG/24HR 24 hr patch 1 patch, 1 patch, Transdermal, Daily, Carlos Page NP, 1 patch at 12/04/18 0843     nicotine Patch in Place, , Transdermal, Q8H, White, Carlos L, NP     nicotine patch REMOVAL, , Transdermal, Daily, Carlos Page, NP, Stopped at  11/24/18 0833     nicotine polacrilex lozenge 4 mg, 4 mg, Buccal, Q2H PRN, Carlos Page NP     OLANZapine (zyPREXA) tablet 10 mg, 10 mg, Oral, Q2H PRN, 10 mg at 11/29/18 1108 **OR** OLANZapine (zyPREXA) injection 10 mg, 10 mg, Intramuscular, Q2H PRN, Mahsa Iraheta, NP     prazosin (MINIPRESS) capsule 1 mg, 1 mg, Oral, At Bedtime, Carlos Page NP, 1 mg at 12/03/18 2023     QUEtiapine (SEROquel) tablet 25-50 mg, 25-50 mg, Oral, TID PRN, Carlos Page NP, 50 mg at 12/03/18 2022     traZODone (DESYREL) tablet 200 mg, 200 mg, Oral, At Bedtime PRN, Carlos Page NP, 200 mg at 12/03/18 2022

## 2018-12-04 NOTE — PLAN OF CARE
"Problem: Patient Care Overview  Goal: Discharge Needs Assessment  Outcome: Improving  Pt has his remote PD this morning - received a message from Maxine [facility]u [facility] stating that they needed to change pt  time from 9:00 to 8:00 - she states \"he has to be ready by 7:30am we are coming a long way\".   "

## 2018-12-05 VITALS
HEIGHT: 68 IN | TEMPERATURE: 97.2 F | BODY MASS INDEX: 24.28 KG/M2 | WEIGHT: 160.2 LBS | HEART RATE: 85 BPM | SYSTOLIC BLOOD PRESSURE: 106 MMHG | DIASTOLIC BLOOD PRESSURE: 63 MMHG | OXYGEN SATURATION: 96 % | RESPIRATION RATE: 14 BRPM

## 2018-12-05 PROCEDURE — 25000132 ZZH RX MED GY IP 250 OP 250 PS 637: Performed by: NURSE PRACTITIONER

## 2018-12-05 RX ADMIN — BUSPIRONE HYDROCHLORIDE 15 MG: 15 TABLET ORAL at 06:47

## 2018-12-05 ASSESSMENT — ACTIVITIES OF DAILY LIVING (ADL): GROOMING: INDEPENDENT

## 2018-12-05 NOTE — PLAN OF CARE
"Problem: Patient Care Overview  Goal: Individualization & Mutuality  Patient will be cooperative with assessments  Patient will eat at least 75% meals  Patient will sleep 6-8 hours     Outcome: Adequate for Discharge Date Met: 12/05/18  Pt cooperative and pleasant. Looking forward to next step forward. States \"I'm sarah bummed I can't be home for the holidays\". Appears calm and relaxed. Denies adverse thoughts at this time. Pt stable at discharge.       "

## 2018-12-05 NOTE — PLAN OF CARE
Problem: Patient Care Overview  Goal: Individualization & Mutuality  Patient will be cooperative with assessments  Patient will eat at least 75% meals  Patient will sleep 6-8 hours     Outcome: No Change  Patient is alert and up on unit. He is quiet but sits with other patients in the lounge area. Currently denies any depression or thoughts of suicide. He denies auditory or visual hallucinations. Patient admits to anxiety and some frustration related to waiting to get to treatment and now having to spend Christmas and New Year in treatment since it's a 30 day program. Patient states that he would not have had to delay this if he was able to have his family transport him but the [facility] wouldn't allow it. Patient otherwise denies having any physical problems. Attending some of the groups.    2005 Patient requested and was given Seroquel 50 mg po and Trazodone 200 mg po for anxiety and to help with sleep.    Problem: Depressive Symptoms  Goal: Depressive Symptoms  Signs and symptoms of listed problems will be absent or manageable.  Patient's depression will be improved by discharge  Patient will remain calm and in control    Patient will verbalize if anxiety and or anger is presenting a problem.   Outcome: Improving  Patient able to make good eye contact and denies having any current depression or thoughts of suicide. Patient admits to anxiety.

## 2018-12-05 NOTE — PLAN OF CARE
Face to face end of shift report received from Kwasi CHOI. Rounding completed. Patient observed.     Zari Rivera  12/5/2018  7:3

## 2018-12-05 NOTE — PLAN OF CARE
Discharge Note    Patient Discharged to rehabilitation facility on 12/5/2018 9:27 AM via Private Car accompanied by  [employer] .     Patient informed of discharge instructions in AVS. patient verbalizes understanding and denies having any questions pertaining to AVS. Patient stable at time of discharge. Patient denies SI, HI, and thoughts of self harm at time of discharge. All personal belongings returned to patient. Discharge prescriptions sent with pt from [facility] pharmacy via electronic communication. Psych evaluation, history and physical, AVS, and discharge summary faxed to next level of care- .     Zari Rivera  12/5/2018  9:27 AM

## 2018-12-05 NOTE — PLAN OF CARE
Face to face end of shift report received from Ami HARRELL RN. Rounding completed. Patient observed.     Kwasi Valdes  12/4/2018  11:39 PM

## 2018-12-06 NOTE — PLAN OF CARE
Problem: Patient Care Overview  Goal: Discharge Needs Assessment  Outcome: Adequate for Discharge Date Met: 12/06/18  Pt is discharging at the recommendation of the treatment team. Pt is discharging to [facility] transported by [facility]. Pt denies having any thoughts of hurting themself or anyone else. Pt denies anxiety or depression. Pt has follow up with [facility] and [facility]. Discharge instructions, including; demographic sheet, psychiatric evaluation, discharge summary, and AVS were faxed to these next level of care providers.

## 2024-05-31 NOTE — PLAN OF CARE
Problem: Patient Care Overview  Goal: Team Discussion  Team Plan:   BEHAVIORAL TEAM DISCUSSION    Participants:  Mahsa Iraheta NP, Carlos Page NP, Lisa Daniels NP, Veronica Oconnor LSW, Zenaida Willams LSW, Laura Portillo RN, Libertad Love RN, [facility], Reyna Hankins OT, Elizabeth Tucker OT, Elizabeth Wilson OT  Progress: fair, less anger, remaining calm  Continued Stay Criteria/Rationale: discharge to [facility] on Wednesday  Medical/Physical: TBI  Precautions:   Behavioral Orders   Procedures     Code 1 - Restrict to Unit     Routine Programming     As clinically indicated     Self Injury Precaution     Status 15     Every 15 minutes.     Plan: remote PD tomorrow, discharge to [facility] on Wednesday  Rationale for change in precautions or plan: None    Patient Active Problem List   Diagnosis     Psychosis (H)       Current Facility-Administered Medications:      acetaminophen (TYLENOL) tablet 650 mg, 650 mg, Oral, Q4H PRN, Mahsa Iraheta NP, 650 mg at 11/26/18 2045     alum & mag hydroxide-simethicone (MYLANTA ES/MAALOX  ES) suspension 30 mL, 30 mL, Oral, Q4H PRN, Mahsa Iraheta NP     bisacodyl (DULCOLAX) Suppository 10 mg, 10 mg, Rectal, Daily PRN, Mahsa Iraheta NP     busPIRone (BUSPAR) tablet 10 mg, 10 mg, Oral, BID, Carlos Page NP, 10 mg at 12/03/18 0808     cloNIDine (CATAPRES) tablet 0.1 mg, 0.1 mg, Oral, BID PRN, Carlos Page NP, 0.1 mg at 11/30/18 0823     hydrOXYzine (ATARAX) tablet  mg,  mg, Oral, Q4H PRN, Carlos Page NP, 100 mg at 11/26/18 1258     magnesium hydroxide (MILK OF MAGNESIA) suspension 30 mL, 30 mL, Oral, At Bedtime PRN, Mahsa Iraheta NP     nicotine (NICODERM CQ) 21 MG/24HR 24 hr patch 1 patch, 1 patch, Transdermal, Daily, Carlos Page NP, 1 patch at 12/03/18 0809     nicotine Patch in Place, , Transdermal, Q8H, Carlos Page, NP     nicotine patch REMOVAL, , Transdermal, Daily, Carlos Page, NP, Stopped at 11/24/18 0883      Your workup from the emergency department did not show any significant pathology but you were admitted to the observation unit for ongoing evaluation.    Your blood pressure was found to be low, this may be causing your symptoms. Your midodrine has been increased to 10mg three times a day.     We no longer need to keep you in the hospital but that does not mean you are done being treated  -Take your prescribed medications as directed  -Follow-up with your primary care physician or the specialist designated or both as scheduled    Please return if your condition worsens or there are new symptoms    If there are concerns that have not been addressed while you have been in the hospital please let the discharge nurse know so the physician can be informed -it is easier to fix problems while you are still here    If you have questions after you have left the hospital please call the unit at  and ask for the observation resident on-call     nicotine polacrilex lozenge 4 mg, 4 mg, Buccal, Q2H PRN, Carlos Page, NP     OLANZapine (zyPREXA) tablet 10 mg, 10 mg, Oral, Q2H PRN, 10 mg at 11/29/18 1108 **OR** OLANZapine (zyPREXA) injection 10 mg, 10 mg, Intramuscular, Q2H PRN, AlaMahsa boykin, NP     prazosin (MINIPRESS) capsule 1 mg, 1 mg, Oral, At Bedtime, Carlos Page NP, 1 mg at 12/02/18 2036     QUEtiapine (SEROquel) tablet 25-50 mg, 25-50 mg, Oral, TID PRN, Carlos Page NP, 50 mg at 12/02/18 2036     traZODone (DESYREL) tablet 100 mg, 100 mg, Oral, At Bedtime PRN, Carlos Page NP, 100 mg at 12/02/18 2155
